# Patient Record
Sex: MALE | Race: WHITE | NOT HISPANIC OR LATINO | Employment: FULL TIME | URBAN - METROPOLITAN AREA
[De-identification: names, ages, dates, MRNs, and addresses within clinical notes are randomized per-mention and may not be internally consistent; named-entity substitution may affect disease eponyms.]

---

## 2022-05-19 ENCOUNTER — CLINICAL SUPPORT (OUTPATIENT)
Dept: SPORTS MEDICINE | Facility: CLINIC | Age: 27
End: 2022-05-19

## 2022-06-09 ENCOUNTER — OFFICE VISIT (OUTPATIENT)
Dept: SPORTS MEDICINE | Facility: CLINIC | Age: 27
End: 2022-06-09

## 2022-06-09 VITALS — WEIGHT: 193 LBS | BODY MASS INDEX: 23.5 KG/M2 | RESPIRATION RATE: 16 BRPM | HEIGHT: 76 IN | TEMPERATURE: 97.5 F

## 2022-06-09 DIAGNOSIS — S06.0X0A CONCUSSION WITHOUT LOSS OF CONSCIOUSNESS, INITIAL ENCOUNTER: Primary | ICD-10-CM

## 2022-06-09 PROCEDURE — LCFCNOCHG: Performed by: FAMILY MEDICINE

## 2022-06-09 NOTE — PROGRESS NOTES
"Zach is a 27 y.o. year old male presents to [facility]    Chief Complaint   Patient presents with   • Concussion     New concussion evaluation - DOI 06/05/2022        History of Present Illness  Loss of consciousness on the field for approximately 3 minutes for  report.  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  He was monitored through the remainder of the game in the locker room where he vomited once during the game.  He did have altered mental status, confusion.  Following the match, he vomited once again and was not improving in symptomatology so it was deemed prudent to have emergent evaluation at the local hospital in [facility].  From  report who was present during the entire evaluation, he had a CT head performed which was unremarkable.  He returned home by flight the following day and has been at home with observation by his fiancée.  His symptoms have been improving day by day though he still reports fatigue, feeling slowed down.  Has been working with optometrist for oculomotor rehab which has been helpful though he does endorse fatigue toward the end of the sessions.  Has been on 2 walks at home, 15 minutes approximately but he does report fatigue as well at the end.    I have reviewed the patient's medical, family, and social history in detail and updated the computerized patient record.    Temp 97.5 °F (36.4 °C) (Temporal)   Resp 16   Ht 193 cm (76\")   Wt 87.5 kg (193 lb)   BMI 23.49 kg/m²      Physical Exam    Mask worn thru encounter  Vital signs reviewed.   General: No acute distress.  Eyes: conjunctiva clear; pupils equally round and reactive; EOMI  ENT: external ears atraumatic  CV: no peripheral edema  Resp: normal respiratory effort, no use of accessory muscles  Skin: no rashes or wounds; normal turgor  Psych: mood and affect appropriate; recent and remote memory intact  Neuro: sensation to " light touch intact      Scat 5 form reviewed.  Total number of symptoms: 4/22.  Symptom severity score: 6/132.  Perception of normal: 85%.             Diagnoses and all orders for this visit:    Concussion without loss of consciousness, initial encounter    Can continue work with optometry regarding oculomotor rehab.  Did discuss continued advancement of physical activity as tolerated, can advance to sport specific rehab when his symptoms resolved.  Discussed timeline, expectations given his concussion.  He is hopeful for full recovery within the next 2 to 3 weeks.      Follow Up   Return if symptoms worsen or fail to improve.  Patient was given instructions and counseling regarding his condition or for health maintenance advice. Please see specific information pulled into the AVS if appropriate.     EMR Dragon/Transcription disclaimer:    Much of this encounter note is an electronic transcription/translation of spoken language to printed text.  The electronic translation of spoken language may permit erroneous, or at times, nonsensical words or phrases to be inadvertently transcribed.  Although I have reviewed the note for such errors some may still exist.

## 2022-06-27 ENCOUNTER — OFFICE VISIT (OUTPATIENT)
Dept: SPORTS MEDICINE | Facility: CLINIC | Age: 27
End: 2022-06-27

## 2022-06-27 VITALS — RESPIRATION RATE: 16 BRPM | WEIGHT: 193 LBS | HEIGHT: 76 IN | TEMPERATURE: 97.8 F | BODY MASS INDEX: 23.5 KG/M2

## 2022-06-27 DIAGNOSIS — S06.0X0D CONCUSSION WITHOUT LOSS OF CONSCIOUSNESS, SUBSEQUENT ENCOUNTER: Primary | ICD-10-CM

## 2022-06-27 PROCEDURE — LCFCNOCHG: Performed by: FAMILY MEDICINE

## 2022-06-27 NOTE — PROGRESS NOTES
"Zach is a 27 y.o. year old male presents to [facility]    Chief Complaint   Patient presents with   • Concussion     F/u eval for concussion - DOI 06/05/2022        History of Present Illness  sxs have resolved. 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 Has not been in full practice as of yet. Feels fatigue but relates it to deconditioning. No recreation of sxs during training. Has been working w/optometrist daily.    I have reviewed the patient's medical, family, and social history in detail and updated the computerized patient record.    Temp 97.8 °F (36.6 °C) (Temporal)   Resp 16   Ht 193 cm (75.98\")   Wt 87.5 kg (193 lb)   BMI 23.50 kg/m²      Physical Exam    Mask worn thru encounter  Vital signs reviewed.   General: No acute distress.  Eyes: conjunctiva clear; pupils equally round and reactive  ENT: external ears atraumatic  CV: no peripheral edema  Resp: normal respiratory effort, no use of accessory muscles  Skin: no rashes or wounds; normal turgor  Psych: mood and affect appropriate; recent and remote memory intact  Neuro: sensation to light touch intact    SCAT-5 form reviewed. 0 symptoms.               Diagnoses and all orders for this visit:    Concussion without loss of consciousness, subsequent encounter    0 symptoms. Continue to advance activity as tolerated.      Follow Up   No follow-ups on file.  Patient was given instructions and counseling regarding his condition or for health maintenance advice. Please see specific information pulled into the AVS if appropriate.     EMR Dragon/Transcription disclaimer:    Much of this encounter note is an electronic transcription/translation of spoken language to printed text.  The electronic translation of spoken language may permit erroneous, or at times, nonsensical words or phrases to be inadvertently transcribed.  Although I have reviewed the note for such errors some may still exist.   "

## 2022-07-29 ENCOUNTER — DOCUMENTATION (OUTPATIENT)
Dept: SPORTS MEDICINE | Facility: CLINIC | Age: 27
End: 2022-07-29

## 2022-07-29 RX ORDER — METHYLPREDNISOLONE 4 MG/1
TABLET ORAL
Qty: 21 TABLET | Refills: 0 | Status: SHIPPED | OUTPATIENT
Start: 2022-07-29 | End: 2022-10-29

## 2022-10-06 ENCOUNTER — OFFICE VISIT (OUTPATIENT)
Dept: ORTHOPEDIC SURGERY | Facility: CLINIC | Age: 27
End: 2022-10-06

## 2022-10-06 VITALS
BODY MASS INDEX: 23.5 KG/M2 | DIASTOLIC BLOOD PRESSURE: 75 MMHG | WEIGHT: 193 LBS | HEART RATE: 63 BPM | SYSTOLIC BLOOD PRESSURE: 117 MMHG | HEIGHT: 76 IN

## 2022-10-06 DIAGNOSIS — M54.42 LEFT-SIDED LOW BACK PAIN WITH LEFT-SIDED SCIATICA, UNSPECIFIED CHRONICITY: Primary | ICD-10-CM

## 2022-10-06 PROCEDURE — 72100 X-RAY EXAM L-S SPINE 2/3 VWS: CPT | Performed by: ORTHOPAEDIC SURGERY

## 2022-10-06 PROCEDURE — 99203 OFFICE O/P NEW LOW 30 MIN: CPT | Performed by: ORTHOPAEDIC SURGERY

## 2022-10-06 RX ORDER — METHYLPREDNISOLONE 4 MG/1
TABLET ORAL
Qty: 1 EACH | Refills: 0 | Status: SHIPPED | OUTPATIENT
Start: 2022-10-06 | End: 2022-10-29

## 2022-10-06 RX ORDER — CYCLOBENZAPRINE HCL 5 MG
10 TABLET ORAL 3 TIMES DAILY PRN
Qty: 45 TABLET | Refills: 0 | Status: SHIPPED | OUTPATIENT
Start: 2022-10-06

## 2022-10-06 NOTE — PROGRESS NOTES
"Subjective:     Patient ID: Zach Ann is a 27 y.o. male.    Chief Complaint:  Low back pain with radiation into the left posterior thigh  History of Present Illness  Zach Ann presents to clinic today for evaluation of left low back pain with radiation to left posterior thigh, had an acute exacerbation of pain last night and again with a collision episode with his knee and hip in a flexed position.  He has had pain and tightness in his posterior hamstring since that time, did improve overnight, rates associated pain as a 4-5 out of 10 shooting in nature denies any associated numbness or tingling does have some elements of burning type pain noted.  He did have a prior episode of back tightness and pain at the beginning of the season treated successfully with Medrol Dosepak.  Pain is worse with sitting walking and flexed hip position as well as with knee extension.  Denies any weakness to his left lower extremity     Social History     Occupational History   • Not on file   Tobacco Use   • Smoking status: Never Smoker   • Smokeless tobacco: Never Used   Vaping Use   • Vaping Use: Never used   Substance and Sexual Activity   • Alcohol use: Never   • Drug use: Never   • Sexual activity: Defer      History reviewed. No pertinent past medical history.  History reviewed. No pertinent surgical history.    History reviewed. No pertinent family history.      Review of Systems        Objective:  Vitals:    10/06/22 1119   BP: 117/75   Pulse: 63   Weight: 87.5 kg (193 lb)   Height: 193 cm (76\")         10/06/22  1119   Weight: 87.5 kg (193 lb)     Body mass index is 23.49 kg/m².  Physical Exam    Vital signs reviewed.   General: No acute distress, alert and oriented  Eyes: conjunctiva clear; pupils equally round and reactive  ENT: external ears and nose atraumatic; oropharynx clear  CV: no peripheral edema  Resp: normal respiratory effort  Skin: no rashes or wounds; normal turgor  Psych: mood and affect appropriate; recent " and remote memory intact          Ortho Exam     Lumbar spine-maximal tenderness in left paraspinal region with moderate increased pain on forward bend test mild increased pain on rotation to the left and right, moderately positive straight leg raise left lower extremity with hamstring contracture approximately 30 degrees on the left compared to 10 degrees on the right.  No hip pain in the groin region on logroll or Stinchfield exam.  Positive sensation light touch all distributions left foot and lower extremity symmetric to the right, 5 out of 5 strength on knee, ankle, and toe flexion and extension against resistance on the left symmetric to the right.    Imaging:  Lumbar Spine X-Ray  Indication: Pain  AP and Lateral views    Findings:  No fracture  No bony lesion  Normal soft tissues  Normal disc spaces    No prior studies were available for comparison.    Assessment:        1. Left-sided low back pain with left-sided sciatica, unspecified chronicity           Plan:          1. Discussed treatment options at length with patient at today's visit.  At this point time we will proceed with a prednisone taper as well as Flexeril to be taken at night  2. Recommended heat and stretching as well as treatment with  to try to help improve current level of symptoms  3. We will likely hold on a train for the next 4 to 5 days but can try to progress back into training at the beginning of next week  4. Follow up: As needed in training room      Zach Ann was in agreement with plan and had all questions answered.     Orders:  Orders Placed This Encounter   Procedures   • XR Spine Lumbar 2 or 3 View       Medications:  New Medications Ordered This Visit   Medications   • methylPREDNISolone (MEDROL) 4 MG dose pack     Sig: Take as directed on package instructions.     Dispense:  1 each     Refill:  0   • cyclobenzaprine (FLEXERIL) 5 MG tablet     Sig: Take 2 tablets by mouth 3 (Three) Times a Day As Needed for  Muscle Spasms.     Dispense:  45 tablet     Refill:  0       Followup:  No follow-ups on file.    Diagnoses and all orders for this visit:    1. Left-sided low back pain with left-sided sciatica, unspecified chronicity (Primary)  -     XR Spine Lumbar 2 or 3 View    Other orders  -     methylPREDNISolone (MEDROL) 4 MG dose pack; Take as directed on package instructions.  Dispense: 1 each; Refill: 0  -     cyclobenzaprine (FLEXERIL) 5 MG tablet; Take 2 tablets by mouth 3 (Three) Times a Day As Needed for Muscle Spasms.  Dispense: 45 tablet; Refill: 0          Dictated utilizing Dragon dictation

## 2022-10-10 ENCOUNTER — HOSPITAL ENCOUNTER (OUTPATIENT)
Dept: MRI IMAGING | Facility: HOSPITAL | Age: 27
Discharge: HOME | End: 2022-10-10

## 2022-10-10 DIAGNOSIS — M54.50 LUMBAR PAIN: Primary | ICD-10-CM

## 2022-10-10 DIAGNOSIS — M54.50 LUMBAR PAIN: ICD-10-CM

## 2022-10-10 PROCEDURE — 72148 MRI LUMBAR SPINE W/O DYE: CPT

## 2022-10-11 DIAGNOSIS — M51.26 LUMBAR DISC HERNIATION: Primary | ICD-10-CM

## 2022-10-11 RX ORDER — GABAPENTIN 300 MG/1
300 CAPSULE ORAL NIGHTLY
Qty: 30 CAPSULE | Refills: 0 | Status: SHIPPED | OUTPATIENT
Start: 2022-10-11 | End: 2022-11-15

## 2022-10-13 ENCOUNTER — TELEPHONE (OUTPATIENT)
Dept: SPORTS MEDICINE | Facility: CLINIC | Age: 27
End: 2022-10-13

## 2022-10-13 DIAGNOSIS — M51.26 LUMBAR DISC HERNIATION: Primary | ICD-10-CM

## 2022-10-13 NOTE — TELEPHONE ENCOUNTER
I called  Office and spoke with Atif from  office in regards to the referral for Neurosurgery. She informed me that they would get in contact with the patient to see when he is able to come in on Monday. No further action needed at this time. Thank you!        -RAFAELA Morrison

## 2022-10-17 ENCOUNTER — OFFICE VISIT (OUTPATIENT)
Dept: NEUROSURGERY | Facility: CLINIC | Age: 27
End: 2022-10-17

## 2022-10-17 VITALS
DIASTOLIC BLOOD PRESSURE: 84 MMHG | OXYGEN SATURATION: 98 % | WEIGHT: 193 LBS | HEIGHT: 76 IN | TEMPERATURE: 97.5 F | BODY MASS INDEX: 23.5 KG/M2 | SYSTOLIC BLOOD PRESSURE: 131 MMHG | HEART RATE: 89 BPM

## 2022-10-17 DIAGNOSIS — M51.16 NEURITIS OR RADICULITIS DUE TO RUPTURE OF LUMBAR INTERVERTEBRAL DISC: Primary | ICD-10-CM

## 2022-10-17 PROCEDURE — 99204 OFFICE O/P NEW MOD 45 MIN: CPT | Performed by: NEUROLOGICAL SURGERY

## 2022-10-17 RX ORDER — DEXAMETHASONE 1.5 MG/1
TABLET ORAL
Qty: 51 TABLET | Refills: 0 | Status: SHIPPED | OUTPATIENT
Start: 2022-10-17 | End: 2022-10-29 | Stop reason: SDUPTHER

## 2022-10-17 NOTE — PROGRESS NOTES
Subjective   Patient ID: Zach Ann is a 27 y.o. male is being seen for consultation today at the request of Jayden Vickers * for lumbar disc herniation. Patient had a MRI Lumbar on 10/10/2022 at [facility]    Treatment: no recent treatment    Today patient states that he has low back pain that radiates to the L leg, along with N/T in the L leg. Patient denied B/B incontinence.     Patient, Provider, and MA are all wearing a mask in our office today.     History of Present Illness     This patient began having trouble with his lower back some years ago.  This has come and gone over the years.  About 4 5 weeks ago he felt a pop in his back and had some pain in his back and into his left buttock.  About a week and a half ago the pain got a lot worse and he had pain radiating all the way down his leg into the hamstring.  He also noticed some weakness particularly with tiptoeing on the left side.  He said by the middle of last week he could not tiptoe at all but then started some physical therapy including modalities and has now gotten some improvement in the strength.  He cannot tiptoe although he does fatigue more easily.  The right side is okay.  He has no difficulty with bowel bladder control or other associated symptoms.  He has been treated with some oral steroids as well.  He has no difficulty with bowel bladder control.  Over the last several days the pain is gotten a lot better and he has no pain at all now.  He does still have some numbness in his lateral calf and lateral foot and the weakness still in his gastroc.    The following portions of the patient's history were reviewed and updated as appropriate: allergies, current medications, past family history, past medical history, past social history, past surgical history and problem list.    Review of Systems   Constitutional: Negative for chills and fever.   HENT: Negative for congestion.    Genitourinary: Negative for difficulty urinating and dysuria.  "  Musculoskeletal: Positive for back pain and myalgias. Negative for gait problem.        L leg pain   Neurological: Positive for weakness and numbness.       I have reviewed the review of systems as documented by my MA.      Objective     Vitals:    10/17/22 1457   BP: 131/84   Cuff Size: Adult   Pulse: 89   Temp: 97.5 °F (36.4 °C)   SpO2: 98%   Weight: 87.5 kg (193 lb)   Height: 193 cm (76\")     Body mass index is 23.49 kg/m².      Physical Exam  Constitutional:       Appearance: He is well-developed.   HENT:      Head: Normocephalic and atraumatic.   Eyes:      Extraocular Movements: EOM normal.      Conjunctiva/sclera: Conjunctivae normal.      Pupils: Pupils are equal, round, and reactive to light.   Neck:      Vascular: No carotid bruit.   Neurological:      Mental Status: He is oriented to person, place, and time.      Coordination: Finger-Nose-Finger Test and Heel to Shin Test normal.      Gait: Gait is intact.      Deep Tendon Reflexes:      Reflex Scores:       Tricep reflexes are 2+ on the right side and 2+ on the left side.       Bicep reflexes are 2+ on the right side and 2+ on the left side.       Brachioradialis reflexes are 2+ on the right side and 2+ on the left side.       Patellar reflexes are 2+ on the right side and 2+ on the left side.       Achilles reflexes are 2+ on the right side and 0 on the left side.  Psychiatric:         Speech: Speech normal.       Neurologic Exam     Mental Status   Oriented to person, place, and time.   Registration of memory: Good recent and remote memory.   Attention: normal. Concentration: normal.   Speech: speech is normal   Level of consciousness: alert  Knowledge: consistent with education.     Cranial Nerves     CN II   Visual fields full to confrontation.   Visual acuity: normal    CN III, IV, VI   Pupils are equal, round, and reactive to light.  Extraocular motions are normal.     CN V   Facial sensation intact.   Right corneal reflex: normal  Left corneal " reflex: normal    CN VII   Facial expression full, symmetric.   Right facial weakness: none  Left facial weakness: none    CN VIII   Hearing: intact    CN IX, X   Palate: symmetric    CN XI   Right sternocleidomastoid strength: normal  Left sternocleidomastoid strength: normal    CN XII   Tongue: not atrophic  Tongue deviation: none    Motor Exam   Muscle bulk: normal  Right arm tone: normal  Left arm tone: normal  Right leg tone: normal  Left leg tone: normal    Strength   Strength 5/5 except as noted.   Left gastroc: 4/5  He can tiptoe on the left 4 or 5 times but he does fatigue a bit more easily than on the right.     Sensory Exam   Light touch normal.   Sensory deficit distribution on left: S1    Gait, Coordination, and Reflexes     Gait  Gait: normal    Coordination   Finger to nose coordination: normal  Heel to shin coordination: normal    Reflexes   Right brachioradialis: 2+  Left brachioradialis: 2+  Right biceps: 2+  Left biceps: 2+  Right triceps: 2+  Left triceps: 2+  Right patellar: 2+  Left patellar: 2+  Right achilles: 2+  Left achilles: 0  Right : 2+  Left : 2+          Assessment & Plan   Independent Review of Radiographic Studies:      I personally reviewed the images from the following studies.    I reviewed an MRI of his lumbar spine done on 10 October.  This shows a fairly large disc herniation on the left side at L5-S1.  It is right at the level of the S1 pedicle and is almost certainly compressing the left S1 nerve root.    Medical Decision Making:      Had a very extended discussion with the patient and his  about the situation.  I explained that I would normally recommend proceeding with surgery on an urgent basis in this situation because he has a neurologic deficit and a large disc herniation.  On the other hand he has been getting some improvement in his neurologic exam and can now tiptoe versus last week when he could not at all.  Consequently this makes recommending urgent  surgery a little bit more difficult.  I suggested that we try him on another 13-day course of oral steroids and that he can go ahead and continue with his therapy.  I told him to do low impact therapy and lower weights that they load his lower back.  I think he can play soccer if he wishes I did explain that there is a small risk of a massive disc herniation causing a cauda equina syndrome but I really do not think this risk is any greater in him that it would be if we did surgery today, allowed him to heal and then let him go back to play.  I suggested that we follow him up after the season is over to see how his strength is.  That will be about another month from now and if he is not completely better then he probably should consider surgery.  He agrees with this.  They both had a number of questions which I answered to their satisfaction.  He said he would call for an appointment.    Diagnoses and all orders for this visit:    1. Neuritis or radiculitis due to rupture of lumbar intervertebral disc (Primary)    Other orders  -     dexamethasone (DECADRON) 1.5 MG tablet; 3 bid x 4d, 3 q AM and 2 q PM x 1d, 2 bid x 3d, 2 q AM and 1 q PM x 1d, 1 bid x 3d, 1 q AM until gone  Dispense: 51 tablet; Refill: 0      Return in about 4 weeks (around 11/14/2022).

## 2022-10-29 ENCOUNTER — DOCUMENTATION (OUTPATIENT)
Dept: SPORTS MEDICINE | Facility: CLINIC | Age: 27
End: 2022-10-29

## 2022-10-29 RX ORDER — DEXAMETHASONE 1.5 MG/1
TABLET ORAL
Qty: 51 TABLET | Refills: 0 | Status: SHIPPED | OUTPATIENT
Start: 2022-10-29 | End: 2022-11-15

## 2022-11-02 ENCOUNTER — TELEPHONE (OUTPATIENT)
Dept: NEUROSURGERY | Facility: CLINIC | Age: 27
End: 2022-11-02

## 2022-11-02 NOTE — TELEPHONE ENCOUNTER
Caller: Zach Ann    Relationship to patient: Self    Best call back number: [phone]    Chief complaint: WANTS TO SCHEDULE SURGERY    Type of visit: F/U    Requested date: AROUND 11-14-22 PER OFFICE VISIT NOTE 10-26-22    If rescheduling, when is the original appointment: 12-1-22    Additional notes:HUB SCHEDULED WITH CRISTI 12-1-22 F/A BUT PT WANTS SOONER APPT WITH CRISTI TO SCHEDULE SURGERY. 10-26-22 OFFICE VISIT NOTE PER CRISTI SCHEDULE AROUND 11-14-22.     PLEASE REVIEW AND CALL PT TO ADVISE.     THANK YOU

## 2022-11-10 ENCOUNTER — OFFICE VISIT (OUTPATIENT)
Dept: NEUROSURGERY | Facility: CLINIC | Age: 27
End: 2022-11-10

## 2022-11-10 ENCOUNTER — PREP FOR SURGERY (OUTPATIENT)
Dept: OTHER | Facility: HOSPITAL | Age: 27
End: 2022-11-10

## 2022-11-10 VITALS — HEIGHT: 76 IN | WEIGHT: 193 LBS | BODY MASS INDEX: 23.5 KG/M2

## 2022-11-10 DIAGNOSIS — M51.16 NEURITIS OR RADICULITIS DUE TO RUPTURE OF LUMBAR INTERVERTEBRAL DISC: Primary | ICD-10-CM

## 2022-11-10 PROCEDURE — 99214 OFFICE O/P EST MOD 30 MIN: CPT | Performed by: NEUROLOGICAL SURGERY

## 2022-11-10 RX ORDER — CEFAZOLIN SODIUM 2 G/100ML
2 INJECTION, SOLUTION INTRAVENOUS ONCE
Status: CANCELLED | OUTPATIENT
Start: 2022-11-16 | End: 2022-11-10

## 2022-11-10 NOTE — PROGRESS NOTES
"Subjective   Patient ID: Zach Ann is a 27 y.o. male is here today for 4 week follow-up.    Today Patient states he has intermittent N/T in the L leg. Patient denies low back pain    Patient, Provider, and MA are all wearing a mask in our office today.    History of Present Illness     This patient returns today.  He continues with weakness in his left leg.  It does not appear to have gotten any worse but it really has not gotten a lot better either.  He feels that he has stagnated in his improvement.    The following portions of the patient's history were reviewed and updated as appropriate: allergies, current medications, past family history, past medical history, past social history, past surgical history and problem list.    Review of Systems   Constitutional: Negative for chills and fever.   HENT: Negative for congestion.    Genitourinary: Negative for difficulty urinating and dysuria.   Musculoskeletal: Negative for back pain, gait problem and myalgias.   Neurological: Positive for numbness. Negative for weakness.        L leg       I have reviewed the review of systems as documented by my MA.      Objective     Vitals:    11/10/22 0835   Weight: 87.5 kg (193 lb)   Height: 193 cm (76\")     Body mass index is 23.49 kg/m².    Tobacco Use: Low Risk    • Smoking Tobacco Use: Never   • Smokeless Tobacco Use: Never   • Passive Exposure: Not on file          Physical Exam  Neurological:      Mental Status: He is alert and oriented to person, place, and time.       Neurologic Exam     Mental Status   Oriented to person, place, and time.           Assessment & Plan   Independent Review of Radiographic Studies:      I personally reviewed the images from the following studies.    I again reviewed his MRI done on 10 October.  This shows a very large disc herniation on the left side at L5-S1.    Medical Decision Making:      I told the patient, his wife and his  about the imaging.  Since he still has " weakness I see little alternative but to proceed with surgery.  I told the patient about the risks, complications and expected outcome of the lumbar surgery.  I explained that there was an 80% chance of getting rid of the pain in the leg.  I explained that there would still be back pain after the surgery.  Initially this will be quite severe but will improve over time.  There is a 2 or 3% chance of infection, bleeding, CSF leak, damage to the nerve as a result of surgery, paralysis, as well as anesthetic risk.  There is a 5% chance of late instability which could require a fusion later on and a 10% chance of recurrent disc herniation.  We discussed the postoperative hospital and home course.  He would like to proceed.    He will need to be scheduled for a: Left lumbar 5 to sacral 1 laminectomy and discectomy with metrx    Diagnoses and all orders for this visit:    1. Neuritis or radiculitis due to rupture of lumbar intervertebral disc (Primary)      Return for 2-3 week post op.

## 2022-11-15 ENCOUNTER — PRE-ADMISSION TESTING (OUTPATIENT)
Dept: PREADMISSION TESTING | Facility: HOSPITAL | Age: 27
End: 2022-11-15

## 2022-11-15 ENCOUNTER — TELEPHONE (OUTPATIENT)
Dept: NEUROSURGERY | Facility: CLINIC | Age: 27
End: 2022-11-15

## 2022-11-15 VITALS
DIASTOLIC BLOOD PRESSURE: 70 MMHG | WEIGHT: 200 LBS | TEMPERATURE: 97.6 F | HEIGHT: 76 IN | HEART RATE: 49 BPM | RESPIRATION RATE: 20 BRPM | OXYGEN SATURATION: 100 % | SYSTOLIC BLOOD PRESSURE: 112 MMHG | BODY MASS INDEX: 24.36 KG/M2

## 2022-11-15 LAB
ANION GAP SERPL CALCULATED.3IONS-SCNC: 6.9 MMOL/L (ref 5–15)
BUN SERPL-MCNC: 12 MG/DL (ref 6–20)
BUN/CREAT SERPL: 13.6 (ref 7–25)
CALCIUM SPEC-SCNC: 9.8 MG/DL (ref 8.6–10.5)
CHLORIDE SERPL-SCNC: 104 MMOL/L (ref 98–107)
CO2 SERPL-SCNC: 29.1 MMOL/L (ref 22–29)
CREAT SERPL-MCNC: 0.88 MG/DL (ref 0.76–1.27)
DEPRECATED RDW RBC AUTO: 42.1 FL (ref 37–54)
EGFRCR SERPLBLD CKD-EPI 2021: 120.1 ML/MIN/1.73
ERYTHROCYTE [DISTWIDTH] IN BLOOD BY AUTOMATED COUNT: 13 % (ref 12.3–15.4)
GLUCOSE SERPL-MCNC: 102 MG/DL (ref 65–99)
HCT VFR BLD AUTO: 39.9 % (ref 37.5–51)
HGB BLD-MCNC: 13.5 G/DL (ref 13–17.7)
MCH RBC QN AUTO: 30.3 PG (ref 26.6–33)
MCHC RBC AUTO-ENTMCNC: 33.8 G/DL (ref 31.5–35.7)
MCV RBC AUTO: 89.5 FL (ref 79–97)
PLATELET # BLD AUTO: 167 10*3/MM3 (ref 140–450)
PMV BLD AUTO: 10.4 FL (ref 6–12)
POTASSIUM SERPL-SCNC: 4.7 MMOL/L (ref 3.5–5.2)
RBC # BLD AUTO: 4.46 10*6/MM3 (ref 4.14–5.8)
SODIUM SERPL-SCNC: 140 MMOL/L (ref 136–145)
WBC NRBC COR # BLD: 4.73 10*3/MM3 (ref 3.4–10.8)

## 2022-11-15 PROCEDURE — 80048 BASIC METABOLIC PNL TOTAL CA: CPT

## 2022-11-15 PROCEDURE — 36415 COLL VENOUS BLD VENIPUNCTURE: CPT

## 2022-11-15 PROCEDURE — 85027 COMPLETE CBC AUTOMATED: CPT

## 2022-11-15 RX ORDER — CHLORHEXIDINE GLUCONATE 500 MG/1
1 CLOTH TOPICAL
COMMUNITY
End: 2022-12-09 | Stop reason: HOSPADM

## 2022-11-15 NOTE — TELEPHONE ENCOUNTER
I had to r/s pt's first post op appt. He is now on 12/1/22 at 2:45 pm in office with Leida Uribe. His phone is going straight to v/m and it is full so I cannot leave a message. I am mailing a ne appt reminder.

## 2022-11-15 NOTE — DISCHARGE INSTRUCTIONS

## 2022-11-16 ENCOUNTER — TELEPHONE (OUTPATIENT)
Dept: NEUROSURGERY | Facility: CLINIC | Age: 27
End: 2022-11-16

## 2022-11-16 ENCOUNTER — PREP FOR SURGERY (OUTPATIENT)
Dept: OTHER | Facility: HOSPITAL | Age: 27
End: 2022-11-16

## 2022-11-16 ENCOUNTER — OFFICE VISIT (OUTPATIENT)
Dept: NEUROSURGERY | Facility: CLINIC | Age: 27
End: 2022-11-16

## 2022-11-16 VITALS
HEART RATE: 89 BPM | OXYGEN SATURATION: 98 % | WEIGHT: 200 LBS | HEIGHT: 76 IN | DIASTOLIC BLOOD PRESSURE: 84 MMHG | SYSTOLIC BLOOD PRESSURE: 129 MMHG | BODY MASS INDEX: 24.36 KG/M2 | TEMPERATURE: 98 F

## 2022-11-16 DIAGNOSIS — M51.16 NEURITIS OR RADICULITIS DUE TO RUPTURE OF LUMBAR INTERVERTEBRAL DISC: Primary | ICD-10-CM

## 2022-11-16 PROCEDURE — 99214 OFFICE O/P EST MOD 30 MIN: CPT | Performed by: NEUROLOGICAL SURGERY

## 2022-11-16 RX ORDER — CEFAZOLIN SODIUM 2 G/100ML
2 INJECTION, SOLUTION INTRAVENOUS ONCE
Status: CANCELLED | OUTPATIENT
Start: 2022-11-16 | End: 2022-11-16

## 2022-11-16 NOTE — PROGRESS NOTES
"Subjective   Patient ID: Zach Ann is a 27 y.o. male is here today for follow-up to discuss surgery.    Today patient is having N/T in the L leg    Patient, Provider, and MA are all wearing a mask in our office today.     History of Present Illness    This patient continues with pain as well as numbness and tingling in his left leg.  His symptoms are really no better.  He was supposed to have surgery this morning but canceled it because he got concerned about the laminectomy portion of the surgery.    The following portions of the patient's history were reviewed and updated as appropriate: allergies, current medications, past family history, past medical history, past social history, past surgical history and problem list.    Review of Systems   Constitutional: Negative for chills and fever.   HENT: Negative for congestion.    Musculoskeletal: Negative for back pain and myalgias.   Neurological: Positive for weakness and numbness.       I have reviewed the review of systems as documented by my MA.      Objective     Vitals:    11/16/22 1546   BP: 129/84   Cuff Size: Adult   Pulse: 89   Temp: 98 °F (36.7 °C)   SpO2: 98%   Weight: 90.7 kg (200 lb)   Height: 193 cm (76\")     Body mass index is 24.34 kg/m².    Tobacco Use: Low Risk    • Smoking Tobacco Use: Never   • Smokeless Tobacco Use: Never   • Passive Exposure: Not on file          Physical Exam  Neurological:      Mental Status: He is alert and oriented to person, place, and time.       Neurologic Exam     Mental Status   Oriented to person, place, and time.           Assessment & Plan   Independent Review of Radiographic Studies:      I personally reviewed the images from the following studies.    I again reviewed his MRI which was done in October.  This does show a herniated disc on the left side at L5-S1 going down behind the S1 vertebral body.    Medical Decision Making:      I again explained a lumbar laminectomy and discectomy to the patient and his wife. "  I explained that the laminectomy is really more of a laminotomy.  I explained that we have to remove enough bone so as to get to the disc herniation but not so much to destabilize him.  I did explain there was a 10% chance of recurrent disc and a 5% chance of late instability which could require a fusion later on.  On the other hand as long as he is in reasonably good physical condition the chances of that happening should be fairly low.  There is a risk of damaging the nerve as well as the risk of infection, bleeding, and CSF leak.  We again reviewed the postoperative hospital and home course.  He does wish to proceed at this point.  Diagnoses and all orders for this visit:    1. Neuritis or radiculitis due to rupture of lumbar intervertebral disc (Primary)      Return for 2-3 week post op.

## 2022-11-16 NOTE — TELEPHONE ENCOUNTER
Patient called and stated he was suppose to have surgery today but when the nurse went over the procedure with him he stated he did not  fully understand what was  the procedure. He thought it was something else. 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 Stanton wanted him to live the office for clarification.

## 2022-12-09 ENCOUNTER — ANESTHESIA EVENT (OUTPATIENT)
Dept: PERIOP | Facility: HOSPITAL | Age: 27
End: 2022-12-09

## 2022-12-09 ENCOUNTER — ANESTHESIA (OUTPATIENT)
Dept: PERIOP | Facility: HOSPITAL | Age: 27
End: 2022-12-09

## 2022-12-09 ENCOUNTER — HOSPITAL ENCOUNTER (OUTPATIENT)
Facility: HOSPITAL | Age: 27
Setting detail: HOSPITAL OUTPATIENT SURGERY
Discharge: HOME | End: 2022-12-09
Attending: NEUROLOGICAL SURGERY | Admitting: NEUROLOGICAL SURGERY

## 2022-12-09 ENCOUNTER — APPOINTMENT (OUTPATIENT)
Dept: GENERAL RADIOLOGY | Facility: HOSPITAL | Age: 27
End: 2022-12-09

## 2022-12-09 VITALS
WEIGHT: 196.1 LBS | OXYGEN SATURATION: 96 % | BODY MASS INDEX: 23.88 KG/M2 | TEMPERATURE: 97.8 F | DIASTOLIC BLOOD PRESSURE: 69 MMHG | SYSTOLIC BLOOD PRESSURE: 129 MMHG | HEIGHT: 76 IN | RESPIRATION RATE: 16 BRPM | HEART RATE: 68 BPM

## 2022-12-09 DIAGNOSIS — M51.16 NEURITIS OR RADICULITIS DUE TO RUPTURE OF LUMBAR INTERVERTEBRAL DISC: ICD-10-CM

## 2022-12-09 PROCEDURE — 25010000002 NEOSTIGMINE 5 MG/10ML SOLUTION: Performed by: NURSE ANESTHETIST, CERTIFIED REGISTERED

## 2022-12-09 PROCEDURE — 63030 LAMOT DCMPRN NRV RT 1 LMBR: CPT | Performed by: NEUROLOGICAL SURGERY

## 2022-12-09 PROCEDURE — 63030 LAMOT DCMPRN NRV RT 1 LMBR: CPT | Performed by: SPECIALIST/TECHNOLOGIST, OTHER

## 2022-12-09 PROCEDURE — 25010000002 CEFAZOLIN IN DEXTROSE 2-4 GM/100ML-% SOLUTION: Performed by: NEUROLOGICAL SURGERY

## 2022-12-09 PROCEDURE — 25010000002 VANCOMYCIN 1 G RECONSTITUTED SOLUTION 1 EACH VIAL: Performed by: NEUROLOGICAL SURGERY

## 2022-12-09 PROCEDURE — 25010000002 DEXAMETHASONE PER 1 MG: Performed by: NURSE ANESTHETIST, CERTIFIED REGISTERED

## 2022-12-09 PROCEDURE — 72100 X-RAY EXAM L-S SPINE 2/3 VWS: CPT

## 2022-12-09 PROCEDURE — 25010000002 HYDROMORPHONE PER 4 MG: Performed by: NURSE ANESTHETIST, CERTIFIED REGISTERED

## 2022-12-09 PROCEDURE — 25010000002 PROPOFOL 10 MG/ML EMULSION: Performed by: NURSE ANESTHETIST, CERTIFIED REGISTERED

## 2022-12-09 PROCEDURE — C1713 ANCHOR/SCREW BN/BN,TIS/BN: HCPCS | Performed by: NEUROLOGICAL SURGERY

## 2022-12-09 PROCEDURE — 25010000002 FENTANYL CITRATE (PF) 50 MCG/ML SOLUTION: Performed by: NURSE ANESTHETIST, CERTIFIED REGISTERED

## 2022-12-09 PROCEDURE — 25010000002 ONDANSETRON PER 1 MG: Performed by: NURSE ANESTHETIST, CERTIFIED REGISTERED

## 2022-12-09 PROCEDURE — 76000 FLUOROSCOPY <1 HR PHYS/QHP: CPT

## 2022-12-09 DEVICE — FLOSEAL WITH RECOTHROM - 5ML
Type: IMPLANTABLE DEVICE | Site: SPINE LUMBAR | Status: FUNCTIONAL
Brand: FLOSEAL HEMOSTATIC MATRIX

## 2022-12-09 DEVICE — SSC BONE WAX
Type: IMPLANTABLE DEVICE | Site: SPINE LUMBAR | Status: FUNCTIONAL
Brand: SSC BONE WAX

## 2022-12-09 RX ORDER — SODIUM CHLORIDE, SODIUM LACTATE, POTASSIUM CHLORIDE, CALCIUM CHLORIDE 600; 310; 30; 20 MG/100ML; MG/100ML; MG/100ML; MG/100ML
9 INJECTION, SOLUTION INTRAVENOUS CONTINUOUS
Status: DISCONTINUED | OUTPATIENT
Start: 2022-12-09 | End: 2022-12-09 | Stop reason: HOSPADM

## 2022-12-09 RX ORDER — GLYCOPYRROLATE 0.2 MG/ML
INJECTION INTRAMUSCULAR; INTRAVENOUS AS NEEDED
Status: DISCONTINUED | OUTPATIENT
Start: 2022-12-09 | End: 2022-12-09 | Stop reason: SURG

## 2022-12-09 RX ORDER — DIPHENHYDRAMINE HCL 25 MG
25 CAPSULE ORAL
Status: DISCONTINUED | OUTPATIENT
Start: 2022-12-09 | End: 2022-12-09 | Stop reason: HOSPADM

## 2022-12-09 RX ORDER — NALOXONE HCL 0.4 MG/ML
0.2 VIAL (ML) INJECTION AS NEEDED
Status: DISCONTINUED | OUTPATIENT
Start: 2022-12-09 | End: 2022-12-09 | Stop reason: HOSPADM

## 2022-12-09 RX ORDER — DEXAMETHASONE SODIUM PHOSPHATE 4 MG/ML
INJECTION, SOLUTION INTRA-ARTICULAR; INTRALESIONAL; INTRAMUSCULAR; INTRAVENOUS; SOFT TISSUE AS NEEDED
Status: DISCONTINUED | OUTPATIENT
Start: 2022-12-09 | End: 2022-12-09 | Stop reason: SURG

## 2022-12-09 RX ORDER — FAMOTIDINE 10 MG/ML
20 INJECTION, SOLUTION INTRAVENOUS ONCE
Status: COMPLETED | OUTPATIENT
Start: 2022-12-09 | End: 2022-12-09

## 2022-12-09 RX ORDER — OXYCODONE AND ACETAMINOPHEN 7.5; 325 MG/1; MG/1
1 TABLET ORAL EVERY 4 HOURS PRN
Status: DISCONTINUED | OUTPATIENT
Start: 2022-12-09 | End: 2022-12-09 | Stop reason: HOSPADM

## 2022-12-09 RX ORDER — FENTANYL CITRATE 50 UG/ML
50 INJECTION, SOLUTION INTRAMUSCULAR; INTRAVENOUS
Status: DISCONTINUED | OUTPATIENT
Start: 2022-12-09 | End: 2022-12-09 | Stop reason: HOSPADM

## 2022-12-09 RX ORDER — HYDROMORPHONE HYDROCHLORIDE 1 MG/ML
0.5 INJECTION, SOLUTION INTRAMUSCULAR; INTRAVENOUS; SUBCUTANEOUS
Status: DISCONTINUED | OUTPATIENT
Start: 2022-12-09 | End: 2022-12-09 | Stop reason: HOSPADM

## 2022-12-09 RX ORDER — HYDROCODONE BITARTRATE AND ACETAMINOPHEN 5; 325 MG/1; MG/1
1 TABLET ORAL ONCE AS NEEDED
Status: DISCONTINUED | OUTPATIENT
Start: 2022-12-09 | End: 2022-12-09 | Stop reason: HOSPADM

## 2022-12-09 RX ORDER — SODIUM CHLORIDE 0.9 % (FLUSH) 0.9 %
3-10 SYRINGE (ML) INJECTION AS NEEDED
Status: DISCONTINUED | OUTPATIENT
Start: 2022-12-09 | End: 2022-12-09 | Stop reason: HOSPADM

## 2022-12-09 RX ORDER — CEFAZOLIN SODIUM 2 G/100ML
2 INJECTION, SOLUTION INTRAVENOUS ONCE
Status: COMPLETED | OUTPATIENT
Start: 2022-12-09 | End: 2022-12-09

## 2022-12-09 RX ORDER — LIDOCAINE HYDROCHLORIDE 10 MG/ML
0.5 INJECTION, SOLUTION EPIDURAL; INFILTRATION; INTRACAUDAL; PERINEURAL ONCE AS NEEDED
Status: DISCONTINUED | OUTPATIENT
Start: 2022-12-09 | End: 2022-12-09 | Stop reason: HOSPADM

## 2022-12-09 RX ORDER — PROPOFOL 10 MG/ML
VIAL (ML) INTRAVENOUS AS NEEDED
Status: DISCONTINUED | OUTPATIENT
Start: 2022-12-09 | End: 2022-12-09 | Stop reason: SURG

## 2022-12-09 RX ORDER — ROCURONIUM BROMIDE 10 MG/ML
INJECTION, SOLUTION INTRAVENOUS AS NEEDED
Status: DISCONTINUED | OUTPATIENT
Start: 2022-12-09 | End: 2022-12-09 | Stop reason: SURG

## 2022-12-09 RX ORDER — PROMETHAZINE HYDROCHLORIDE 25 MG/1
25 TABLET ORAL ONCE AS NEEDED
Status: DISCONTINUED | OUTPATIENT
Start: 2022-12-09 | End: 2022-12-09 | Stop reason: HOSPADM

## 2022-12-09 RX ORDER — PROMETHAZINE HYDROCHLORIDE 25 MG/1
25 SUPPOSITORY RECTAL ONCE AS NEEDED
Status: DISCONTINUED | OUTPATIENT
Start: 2022-12-09 | End: 2022-12-09 | Stop reason: HOSPADM

## 2022-12-09 RX ORDER — FLUMAZENIL 0.1 MG/ML
0.2 INJECTION INTRAVENOUS AS NEEDED
Status: DISCONTINUED | OUTPATIENT
Start: 2022-12-09 | End: 2022-12-09 | Stop reason: HOSPADM

## 2022-12-09 RX ORDER — HYDRALAZINE HYDROCHLORIDE 20 MG/ML
5 INJECTION INTRAMUSCULAR; INTRAVENOUS
Status: DISCONTINUED | OUTPATIENT
Start: 2022-12-09 | End: 2022-12-09 | Stop reason: HOSPADM

## 2022-12-09 RX ORDER — SODIUM CHLORIDE 0.9 % (FLUSH) 0.9 %
3 SYRINGE (ML) INJECTION EVERY 12 HOURS SCHEDULED
Status: DISCONTINUED | OUTPATIENT
Start: 2022-12-09 | End: 2022-12-09 | Stop reason: HOSPADM

## 2022-12-09 RX ORDER — LABETALOL HYDROCHLORIDE 5 MG/ML
5 INJECTION, SOLUTION INTRAVENOUS
Status: DISCONTINUED | OUTPATIENT
Start: 2022-12-09 | End: 2022-12-09 | Stop reason: HOSPADM

## 2022-12-09 RX ORDER — NEOSTIGMINE METHYLSULFATE 0.5 MG/ML
INJECTION, SOLUTION INTRAVENOUS AS NEEDED
Status: DISCONTINUED | OUTPATIENT
Start: 2022-12-09 | End: 2022-12-09 | Stop reason: SURG

## 2022-12-09 RX ORDER — MIDAZOLAM HYDROCHLORIDE 1 MG/ML
1 INJECTION INTRAMUSCULAR; INTRAVENOUS
Status: DISCONTINUED | OUTPATIENT
Start: 2022-12-09 | End: 2022-12-09 | Stop reason: HOSPADM

## 2022-12-09 RX ORDER — HYDROCODONE BITARTRATE AND ACETAMINOPHEN 5; 325 MG/1; MG/1
1 TABLET ORAL EVERY 4 HOURS PRN
Qty: 35 TABLET | Refills: 0 | Status: SHIPPED | OUTPATIENT
Start: 2022-12-09

## 2022-12-09 RX ORDER — FENTANYL CITRATE 50 UG/ML
INJECTION, SOLUTION INTRAMUSCULAR; INTRAVENOUS AS NEEDED
Status: DISCONTINUED | OUTPATIENT
Start: 2022-12-09 | End: 2022-12-09 | Stop reason: SURG

## 2022-12-09 RX ORDER — DIPHENHYDRAMINE HYDROCHLORIDE 50 MG/ML
12.5 INJECTION INTRAMUSCULAR; INTRAVENOUS
Status: DISCONTINUED | OUTPATIENT
Start: 2022-12-09 | End: 2022-12-09 | Stop reason: HOSPADM

## 2022-12-09 RX ORDER — CEPHALEXIN 500 MG/1
500 CAPSULE ORAL 4 TIMES DAILY
Qty: 20 CAPSULE | Refills: 0 | Status: SHIPPED | OUTPATIENT
Start: 2022-12-09 | End: 2022-12-14

## 2022-12-09 RX ORDER — ONDANSETRON 2 MG/ML
INJECTION INTRAMUSCULAR; INTRAVENOUS AS NEEDED
Status: DISCONTINUED | OUTPATIENT
Start: 2022-12-09 | End: 2022-12-09 | Stop reason: SURG

## 2022-12-09 RX ORDER — LIDOCAINE HYDROCHLORIDE 20 MG/ML
INJECTION, SOLUTION INFILTRATION; PERINEURAL AS NEEDED
Status: DISCONTINUED | OUTPATIENT
Start: 2022-12-09 | End: 2022-12-09 | Stop reason: SURG

## 2022-12-09 RX ORDER — ONDANSETRON 2 MG/ML
4 INJECTION INTRAMUSCULAR; INTRAVENOUS ONCE AS NEEDED
Status: DISCONTINUED | OUTPATIENT
Start: 2022-12-09 | End: 2022-12-09 | Stop reason: HOSPADM

## 2022-12-09 RX ORDER — EPHEDRINE SULFATE 50 MG/ML
5 INJECTION, SOLUTION INTRAVENOUS ONCE AS NEEDED
Status: DISCONTINUED | OUTPATIENT
Start: 2022-12-09 | End: 2022-12-09 | Stop reason: HOSPADM

## 2022-12-09 RX ADMIN — FAMOTIDINE 20 MG: 10 INJECTION INTRAVENOUS at 09:44

## 2022-12-09 RX ADMIN — LIDOCAINE HYDROCHLORIDE 60 MG: 20 INJECTION, SOLUTION INFILTRATION; PERINEURAL at 10:47

## 2022-12-09 RX ADMIN — CEFAZOLIN SODIUM 2 G: 2 INJECTION, SOLUTION INTRAVENOUS at 10:35

## 2022-12-09 RX ADMIN — GLYCOPYRROLATE 0.6 MCG: 1 INJECTION INTRAMUSCULAR; INTRAVENOUS at 11:50

## 2022-12-09 RX ADMIN — ROCURONIUM BROMIDE 40 MG: 50 INJECTION INTRAVENOUS at 10:47

## 2022-12-09 RX ADMIN — DEXAMETHASONE SODIUM PHOSPHATE 8 MG: 4 INJECTION, SOLUTION INTRA-ARTICULAR; INTRALESIONAL; INTRAMUSCULAR; INTRAVENOUS; SOFT TISSUE at 11:00

## 2022-12-09 RX ADMIN — FENTANYL CITRATE 50 MCG: 0.05 INJECTION, SOLUTION INTRAMUSCULAR; INTRAVENOUS at 11:07

## 2022-12-09 RX ADMIN — PROPOFOL 200 MG: 10 INJECTION, EMULSION INTRAVENOUS at 10:47

## 2022-12-09 RX ADMIN — ONDANSETRON 4 MG: 2 INJECTION INTRAMUSCULAR; INTRAVENOUS at 11:50

## 2022-12-09 RX ADMIN — SODIUM CHLORIDE, POTASSIUM CHLORIDE, SODIUM LACTATE AND CALCIUM CHLORIDE 9 ML/HR: 600; 310; 30; 20 INJECTION, SOLUTION INTRAVENOUS at 09:43

## 2022-12-09 RX ADMIN — FENTANYL CITRATE 50 MCG: 0.05 INJECTION, SOLUTION INTRAMUSCULAR; INTRAVENOUS at 11:05

## 2022-12-09 RX ADMIN — NEOSTIGMINE METHYLSULFATE 4 MG: 0.5 INJECTION INTRAVENOUS at 11:50

## 2022-12-09 RX ADMIN — HYDROMORPHONE HYDROCHLORIDE 0.5 MG: 1 INJECTION, SOLUTION INTRAMUSCULAR; INTRAVENOUS; SUBCUTANEOUS at 12:51

## 2022-12-09 NOTE — BRIEF OP NOTE
LUMBAR DISCECTOMY POSTERIOR WITH METRIX  Progress Note    Zach Ann  12/9/2022    Pre-op Diagnosis:   Neuritis or radiculitis due to rupture of lumbar intervertebral disc [M51.16]       Post-Op Diagnosis Codes:     * Neuritis or radiculitis due to rupture of lumbar intervertebral disc [M51.16]    Procedure/CPT® Codes:        Procedure(s):  Left lumbar 5 to sacral 1 laminectomy and discectomy with metrx        Surgeon(s):  Lane Stanton MD    Anesthesia: General    Staff:   Circulator: Smooth Gongora RN; Rohan Mendoza RN  Radiology Technologist: Janet Perez  Scrub Person: Mary Sharma  Assistant: Vicki Montano CSA  Assistant: Vicki Montano CSA      Estimated Blood Loss: 100ml    Urine Voided: * No values recorded between 12/9/2022 10:42 AM and 12/9/2022 12:06 PM *    Specimens:                None          Drains: * No LDAs found *    Findings: Large disc herniation adherent to the dura        Complications: None      Lane Stanton MD     Date: 12/9/2022  Time: 12:27 EST

## 2022-12-09 NOTE — OP NOTE
Preop diagnosis: Herniated disc left L5-S1 causing lumbar radiculopathy with neurologic deficit    Postop diagnosis: same    Procedure performed: Left L5-S1 laminectomy, medial facetectomy, discectomy using minimal access spinal technologies microsurgical technique microsurgical instrumentation    Surgeon: Lane Stanton M.D.    Assistant: Vicki Montano CFA who was instrumental in helping with visualization of neural structures, hemostasis, and retraction of neural structures.  Her skilled assistance was necessary for the success of this case    Indications for the procedure:  This is a patient with severe pain in the legs.  Previous imaging had shown neural compression at the L5-S1 levels.  As a result of this and the failure of conservative therapy the patient elected to proceed with surgery.    Operative summary:  After induction of general anesthesia the patient was intubated and placed on the operating table in the prone position on a Dave table.  All pressure points were padded including peripheral points of entrapment.  The back was prepped with Chloraprep and then draped with Ioban, half sheets, and a split sheet.      The L5-S1 level was localized with intraoperative fluoroscopy an incision was made on the left just above the pedicle.  Successive dilating tubes up to 18 mm by 4 cm were placed over that area.  Soft tissue was then removed from the supralaminar space.  The inferior laminar arch of L5 as well as the superior laminar arch of S1 and the medial aspect of facet were removed with the Kiersten drill.  The remainder of the operation was done under high-power magnification of the operating microscope using microsurgical technique and microsurgical instrumentation.  The ligamentum flavum was opened and removed out to the level of the pedicle using the Kerrison rongeurs.  This exposed the lateral thecal sac and the nerve root of S1.  Once the lateral recess was opened the dissection was carried up to  the L5 pedicle completely decompressing the superior nerve root.    Once this was done the S1 nerve root was mobilized medially to expose the disc.  There was evidence of a large disc herniation compressing the nerve just under the nerve root.  This was carefully teased out and then the disc space was incised and disc material was removed with the down-biting cervical curettes and the pituitary rongeurs.  It should be noted that the disc was extremely adherent to the dura and had to be peeled off.  In addition there was a more medial condyle component to the disc that I tried to remove as well using right angle instruments but I tried to avoid retracting the nerve root very much.  At 1 point I did go into her axillary and looked at it that way but the retraction laterally on the nerve root was going to be almost as bad as the retraction medially and so I elected not to do that.  Once the disc space was emptied as much as possible bleeding was controlled with bipolar cautery.    Once the entire decompression was completed we were able to explore under the nerve root and the thecal sac using the Castellanos ball probe to be sure there was no evidence of residual compression.  There being none bleeding was controlled again using the bipolar cautery, FloSeal, and thrombin and Gelfoam.  The area was then copiously irrigated with vancomycin solution and the tube was removed. The paraspinous musculature was injected with 100 cc 1/8% Marcaine with 1:200,000 epinephrine solution.    Another gram of Kefzol was given prior to closure.    The incision was then closed in layers and dressed and the patient was taken to the recovery room in stable condition there were no apparent complications.  Sponge, instrument, and needle counts were correct at the end of the procedure.

## 2022-12-09 NOTE — NURSING NOTE
Ambulated around PACU without difficulty. When pt first got up c/o numbness when first sat up but went away withing 10 seconds per pt. No c/o numbness and tingling

## 2022-12-09 NOTE — DISCHARGE INSTRUCTIONS
LUMBAR SURGERY - AMOS COLIN M.D.  [address], [address]  [place] [address]  [phone]    Instructions & Care After Your Lumbar Surgery    1. No sitting except on the commode.  You may lie on a firm couch but not on a waterbed or recliner.  You may lie in any position that is comfortable, using only one pillow under your head. Either stand at a counter or lie on your side for meals. Three weeks after surgery you may begin sitting for 30 minutes 3 times per day.    2. No driving for three weeks.  You may ride in the car in a passenger seat that reclines or lying down in the back seat.      3. No bending. If you drop something allow someone else to pick it up.    4. Don’t lift anything heavier than a coffee cup or paperback book.    5. Gradually increase your activity each day.  You should get out of bed every hour during the day.  Walk outside as soon as you feel up to it.  Walk short distances frequently rather than making a long trip.  Frequency is more important than distance.  Your goal is to be walking 2 to 3 miles per day when you return for your post-operative visit. (Never do this in one trip.)    6. You may climb stairs.    7. Remove your bandage the second day after surgery and leave it off.  If you notice any redness, swelling or drainage, call the office.  There are steri-strips across the incision.  If these are still present ten days after surgery, remove them gently.      8. You may shower five days after surgery.  Keep the incision dry until then.  Don’t let the water beat directly on the incision and gently pat it dry.    9. Physical Therapy will be arranged at your post-operative visit if needed.    10. Your prescription for pain medication may be filled for half the original amount prior to your return office visit.  Due to changes in Federal Law in order to have this medication refilled you must contact the office four days prior to the date and make arrangements to pick the  prescription up in the office.  This prescription refill cannot be called in to the pharmacy. Your prescription will be ready for pick-up the day the refill is due.    Don’t be alarmed if you experience some of your pre-operative symptoms after going home.  This is not uncommon and normally goes away in a few days but may last longer.  If you have any questions or concerns, please call our office.

## 2022-12-09 NOTE — ANESTHESIA PROCEDURE NOTES
Airway  Urgency: elective    Date/Time: 12/9/2022 10:51 AM  Airway not difficult    General Information and Staff    Patient location during procedure: OR    Indications and Patient Condition  Indications for airway management: airway protection    Preoxygenated: yes  MILS maintained throughout  Mask difficulty assessment: 1 - vent by mask    Final Airway Details  Final airway type: endotracheal airway      Successful airway: ETT  Cuffed: yes   Successful intubation technique: direct laryngoscopy  Facilitating devices/methods: intubating stylet  Endotracheal tube insertion site: oral  Blade: Dat  Blade size: 3  ETT size (mm): 7.0  Cormack-Lehane Classification: grade I - full view of glottis  Placement verified by: chest auscultation and capnometry   Measured from: lips  ETT/EBT  to lips (cm): 22  Number of attempts at approach: 1  Assessment: lips, teeth, and gum same as pre-op and atraumatic intubation

## 2022-12-09 NOTE — ANESTHESIA PREPROCEDURE EVALUATION
Anesthesia Evaluation     Patient summary reviewed and Nursing notes reviewed                Airway   Mallampati: I  TM distance: >3 FB  Neck ROM: full  No difficulty expected  Dental - normal exam     Pulmonary - negative pulmonary ROS and normal exam   Cardiovascular - negative cardio ROS and normal exam        Neuro/Psych- negative ROS  GI/Hepatic/Renal/Endo - negative ROS     Musculoskeletal (-) negative ROS    Abdominal  - normal exam    Bowel sounds: normal.   Substance History - negative use     OB/GYN negative ob/gyn ROS         Other                        Anesthesia Plan    ASA 1     general       Anesthetic plan, risks, benefits, and alternatives have been provided, discussed and informed consent has been obtained with: patient.        CODE STATUS:

## 2022-12-10 NOTE — ANESTHESIA POSTPROCEDURE EVALUATION
Patient: Zach Ann    Procedure Summary     Date: 12/09/22 Room / Location: [facility] OR [address] [place] [address] MAIN OR    Anesthesia Start: 1042 Anesthesia Stop: 1212    Procedure: Left lumbar 5 to sacral 1 laminectomy and discectomy with metrx (Left: Spine Lumbar) Diagnosis:       Neuritis or radiculitis due to rupture of lumbar intervertebral disc      (Neuritis or radiculitis due to rupture of lumbar intervertebral disc [M51.16])    Surgeons: Lane Stanton MD Provider: Rodney Ga MD    Anesthesia Type: general ASA Status: 1          Anesthesia Type: general    Vitals  Vitals Value Taken Time   /69 12/09/22 1411   Temp 36.6 °C (97.8 °F) 12/09/22 1355   Pulse 60 12/09/22 1410   Resp 16 12/09/22 1410   SpO2 96 % 12/09/22 1415   Vitals shown include unvalidated device data.        Post Anesthesia Care and Evaluation    Patient location during evaluation: bedside  Patient participation: complete - patient participated  Level of consciousness: awake  Pain score: 1  Pain management: adequate    Airway patency: patent  Anesthetic complications: No anesthetic complications  PONV Status: none  Cardiovascular status: acceptable  Respiratory status: acceptable  Hydration status: acceptable

## 2022-12-27 ENCOUNTER — OFFICE VISIT (OUTPATIENT)
Dept: NEUROSURGERY | Facility: CLINIC | Age: 27
End: 2022-12-27

## 2022-12-27 DIAGNOSIS — Z09 FOLLOW-UP EXAMINATION FOLLOWING SURGERY: Primary | ICD-10-CM

## 2022-12-27 PROCEDURE — 99024 POSTOP FOLLOW-UP VISIT: CPT | Performed by: NEUROLOGICAL SURGERY

## 2022-12-27 NOTE — PROGRESS NOTES
Subjective   Patient ID: Zach Ann is a 27 y.o. male is here today via telephone for 2 week PO follow-up. Patient had a Left lumbar 5 to sacral 1 laminectomy and discectomy with metrx on 12.09.2022    You have chosen to receive care through a telephone visit. Do you consent to use a telephone visit for your medical care today? Yes    We had a telephone visit today.  The patient was at home and I was in the office.  We talked for 5 minutes.    History of Present Illness    I talked to this patient today.  He is doing pretty well.  The numbness and tingling in his leg is gone the pain is gone as well.  He feels he is beginning to get strength back in his calf.    The following portions of the patient's history were reviewed and updated as appropriate: allergies, current medications, past family history, past medical history, past social history, past surgical history and problem list.    Review of Systems    I have reviewed the review of systems as documented by my MA.      Objective       Tobacco Use: Low Risk    • Smoking Tobacco Use: Never   • Smokeless Tobacco Use: Never   • Passive Exposure: Not on file          Physical Exam  Neurological:      Mental Status: He is alert and oriented to person, place, and time.       Neurologic Exam     Mental Status   Oriented to person, place, and time.           Assessment & Plan   Independent Review of Radiographic Studies:      I personally reviewed the images from the following studies.    There is no new imaging to review    Medical Decision Making:      I told the patient he can go ahead and start physical therapy at this point.  He is to call if any problems develop otherwise we will see him back in the office in about a month.  I also told him that he can begin sitting 3 or 4 times a day for another 2 or 3 weeks and then get rid of the sitting restrictions after that.    Diagnoses and all orders for this visit:    1. Follow-up examination following surgery  (Primary)  -     Ambulatory Referral to Physical Therapy      Return in about 4 weeks (around 1/24/2023).

## 2023-01-23 ENCOUNTER — OFFICE VISIT (OUTPATIENT)
Dept: SPORTS MEDICINE | Facility: CLINIC | Age: 28
End: 2023-01-23

## 2023-01-23 VITALS
HEIGHT: 76 IN | SYSTOLIC BLOOD PRESSURE: 124 MMHG | RESPIRATION RATE: 16 BRPM | HEART RATE: 60 BPM | DIASTOLIC BLOOD PRESSURE: 80 MMHG | WEIGHT: 195.2 LBS | OXYGEN SATURATION: 97 % | BODY MASS INDEX: 23.77 KG/M2

## 2023-01-23 DIAGNOSIS — Z00.00 ANNUAL PHYSICAL EXAM: Primary | ICD-10-CM

## 2023-01-23 LAB
25(OH)D3 SERPL-MCNC: 40.2 NG/ML (ref 30–100)
ALBUMIN SERPL-MCNC: 5.2 G/DL (ref 3.5–5.2)
ALBUMIN/GLOB SERPL: 3.5 G/DL
ALP SERPL-CCNC: 74 U/L (ref 39–117)
ALT SERPL W P-5'-P-CCNC: 17 U/L (ref 1–41)
ANION GAP SERPL CALCULATED.3IONS-SCNC: 7 MMOL/L (ref 5–15)
AST SERPL-CCNC: 19 U/L (ref 1–40)
BASOPHILS # BLD AUTO: 0.04 10*3/MM3 (ref 0–0.2)
BASOPHILS NFR BLD AUTO: 0.6 % (ref 0–1.5)
BILIRUB SERPL-MCNC: 0.4 MG/DL (ref 0–1.2)
BUN SERPL-MCNC: 20 MG/DL (ref 6–20)
BUN/CREAT SERPL: 22.5 (ref 7–25)
CALCIUM SPEC-SCNC: 9.5 MG/DL (ref 8.6–10.5)
CHLORIDE SERPL-SCNC: 103 MMOL/L (ref 98–107)
CHOLEST SERPL-MCNC: 142 MG/DL (ref 0–200)
CO2 SERPL-SCNC: 29 MMOL/L (ref 22–29)
CREAT SERPL-MCNC: 0.89 MG/DL (ref 0.76–1.27)
DEPRECATED RDW RBC AUTO: 41.4 FL (ref 37–54)
EGFRCR SERPLBLD CKD-EPI 2021: 119.7 ML/MIN/1.73
EOSINOPHIL # BLD AUTO: 0.06 10*3/MM3 (ref 0–0.4)
EOSINOPHIL NFR BLD AUTO: 0.9 % (ref 0.3–6.2)
ERYTHROCYTE [DISTWIDTH] IN BLOOD BY AUTOMATED COUNT: 12.9 % (ref 12.3–15.4)
GLOBULIN UR ELPH-MCNC: 1.5 GM/DL
GLUCOSE SERPL-MCNC: 109 MG/DL (ref 65–99)
HCT VFR BLD AUTO: 39.3 % (ref 37.5–51)
HDLC SERPL QL: 2.54
HDLC SERPL-MCNC: 56 MG/DL (ref 40–60)
HGB BLD-MCNC: 12.9 G/DL (ref 13–17.7)
IMM GRANULOCYTES # BLD AUTO: 0.01 10*3/MM3 (ref 0–0.05)
IMM GRANULOCYTES NFR BLD AUTO: 0.2 % (ref 0–0.5)
IRON 24H UR-MRATE: 77 MCG/DL (ref 59–158)
IRON SATN MFR SERPL: 24 % (ref 20–50)
LDLC SERPL CALC-MCNC: 68 MG/DL (ref 0–100)
LYMPHOCYTES # BLD AUTO: 1.79 10*3/MM3 (ref 0.7–3.1)
LYMPHOCYTES NFR BLD AUTO: 28.3 % (ref 19.6–45.3)
MCH RBC QN AUTO: 28.7 PG (ref 26.6–33)
MCHC RBC AUTO-ENTMCNC: 32.8 G/DL (ref 31.5–35.7)
MCV RBC AUTO: 87.5 FL (ref 79–97)
MONOCYTES # BLD AUTO: 0.41 10*3/MM3 (ref 0.1–0.9)
MONOCYTES NFR BLD AUTO: 6.5 % (ref 5–12)
NEUTROPHILS NFR BLD AUTO: 4.01 10*3/MM3 (ref 1.7–7)
NEUTROPHILS NFR BLD AUTO: 63.5 % (ref 42.7–76)
NRBC BLD AUTO-RTO: 0 /100 WBC (ref 0–0.2)
PLATELET # BLD AUTO: 202 10*3/MM3 (ref 140–450)
PMV BLD AUTO: 11 FL (ref 6–12)
POTASSIUM SERPL-SCNC: 4.2 MMOL/L (ref 3.5–5.2)
PROT SERPL-MCNC: 6.7 G/DL (ref 6–8.5)
RBC # BLD AUTO: 4.49 10*6/MM3 (ref 4.14–5.8)
SODIUM SERPL-SCNC: 139 MMOL/L (ref 136–145)
TIBC SERPL-MCNC: 317 MCG/DL (ref 298–536)
TRANSFERRIN SERPL-MCNC: 213 MG/DL (ref 200–360)
TRIGL SERPL-MCNC: 99 MG/DL (ref 0–150)
VLDLC SERPL-MCNC: 18 MG/DL (ref 5–40)
WBC NRBC COR # BLD: 6.32 10*3/MM3 (ref 3.4–10.8)

## 2023-01-23 PROCEDURE — 85660 RBC SICKLE CELL TEST: CPT | Performed by: FAMILY MEDICINE

## 2023-01-23 PROCEDURE — 36415 COLL VENOUS BLD VENIPUNCTURE: CPT | Performed by: FAMILY MEDICINE

## 2023-01-23 PROCEDURE — 84466 ASSAY OF TRANSFERRIN: CPT | Performed by: FAMILY MEDICINE

## 2023-01-23 PROCEDURE — LCFCNOCHG: Performed by: FAMILY MEDICINE

## 2023-01-23 PROCEDURE — 82728 ASSAY OF FERRITIN: CPT | Performed by: FAMILY MEDICINE

## 2023-01-23 PROCEDURE — 82306 VITAMIN D 25 HYDROXY: CPT | Performed by: FAMILY MEDICINE

## 2023-01-23 PROCEDURE — 80061 LIPID PANEL: CPT | Performed by: FAMILY MEDICINE

## 2023-01-23 PROCEDURE — 84443 ASSAY THYROID STIM HORMONE: CPT | Performed by: FAMILY MEDICINE

## 2023-01-23 PROCEDURE — 80053 COMPREHEN METABOLIC PANEL: CPT | Performed by: FAMILY MEDICINE

## 2023-01-23 PROCEDURE — 83540 ASSAY OF IRON: CPT | Performed by: FAMILY MEDICINE

## 2023-01-23 PROCEDURE — 85025 COMPLETE CBC W/AUTO DIFF WBC: CPT | Performed by: FAMILY MEDICINE

## 2023-01-23 NOTE — PROGRESS NOTES
I examined Zach today in regards to his low back in particular-he has had majority of his strength return to his left lower extremity with nearly symmetric single-leg toe stance on the left compared to the right as well as 4+ out of 5 strength on the left compared to the right against active resistance.  Negative straight leg raise on exam today his incisions well-healed he has no paraspinal tenderness good recovery of forward bend and lumbar extension with no significant exacerbation of pain.    At this point time we will discuss return to play plan with Dr. Stanton-clinically Zach is progressing very well at this point in his recovery program.

## 2023-01-23 NOTE — PROGRESS NOTES
"Zach Ann is here today for an annual physical exam.     Overall doing well.  Recovering well from lumbar discectomy.    Health Maintenance   Topic Date Due   • COVID-19 Vaccine (1) Never done   • TDAP/TD VACCINES (2 - Td or Tdap) 09/01/2015   • HEPATITIS C SCREENING  Never done   • ANNUAL PHYSICAL  Never done   • INFLUENZA VACCINE  Never done   • Pneumococcal Vaccine 0-64  Aged Out       Review of Systems    /80 (BP Location: Left arm, Patient Position: Sitting, Cuff Size: Adult)   Pulse 60   Resp 16   Ht 193 cm (76\")   Wt 88.5 kg (195 lb 3.2 oz)   SpO2 97%   BMI 23.76 kg/m²      Physical Exam    Vital signs reviewed.  General appearance: No acute distress  Eyes: conjunctiva clear without erythema; pupils equally round and reactive  ENT: external ears normal; hearing normal  Neck: supple; no thyromegaly  CV: normal rate and rhythm; no peripheral edema  Respiratory: normal respiratory effort; lungs clear to auscultation bilaterally  MSK: normal gait and station; no focal joint deformity or swelling  Skin: no rash or wounds; normal turgor  Neuro: cranial nerves 2-12 grossly intact; normal sensation to light touch  Psych: mood and affect normal; recent and remote memory intact      Current Outpatient Medications:   •  cyclobenzaprine (FLEXERIL) 5 MG tablet, Take 2 tablets by mouth 3 (Three) Times a Day As Needed for Muscle Spasms., Disp: 45 tablet, Rfl: 0  •  HYDROcodone-acetaminophen (NORCO) 5-325 MG per tablet, Take 1 tablet by mouth Every 4 (Four) Hours As Needed for Moderate Pain (pain)., Disp: 35 tablet, Rfl: 0    Diagnoses and all orders for this visit:    1. Annual physical exam (Primary)  -     Iron Profile  -     Ferritin  -     CBC & Differential  -     Comprehensive Metabolic Panel  -     Lipid Panel With / Chol / HDL Ratio  -     Vitamin D,25-Hydroxy  -     Sickle Cell Screen  -     Cancel: Thyroid Dillwyn Profile  -     TSH      Overall well. EKG scanned.   Continue to rehab low back and " regain left leg strength.

## 2023-01-24 LAB
FERRITIN SERPL-MCNC: 146 NG/ML (ref 30–400)
TSH SERPL DL<=0.05 MIU/L-ACNC: 1.06 UIU/ML (ref 0.27–4.2)

## 2023-01-25 ENCOUNTER — TELEPHONE (OUTPATIENT)
Dept: SPORTS MEDICINE | Facility: CLINIC | Age: 28
End: 2023-01-25
Payer: COMMERCIAL

## 2023-01-25 LAB — HGB S BLD QL SOLY: NEGATIVE

## 2023-01-25 NOTE — TELEPHONE ENCOUNTER
Reviewed pre-season labs. WNL.    Office Visit on 01/23/2023   Component Date Value Ref Range Status   • Iron 01/23/2023 77  59 - 158 mcg/dL Final   • Iron Saturation 01/23/2023 24  20 - 50 % Final   • Transferrin 01/23/2023 213  200 - 360 mg/dL Final   • TIBC 01/23/2023 317  298 - 536 mcg/dL Final   • Ferritin 01/23/2023 146.00  30.00 - 400.00 ng/mL Final   • Glucose 01/23/2023 109 (H)  65 - 99 mg/dL Final   • BUN 01/23/2023 20  6 - 20 mg/dL Final   • Creatinine 01/23/2023 0.89  0.76 - 1.27 mg/dL Final   • Sodium 01/23/2023 139  136 - 145 mmol/L Final   • Potassium 01/23/2023 4.2  3.5 - 5.2 mmol/L Final   • Chloride 01/23/2023 103  98 - 107 mmol/L Final   • CO2 01/23/2023 29.0  22.0 - 29.0 mmol/L Final   • Calcium 01/23/2023 9.5  8.6 - 10.5 mg/dL Final   • Total Protein 01/23/2023 6.7  6.0 - 8.5 g/dL Final   • Albumin 01/23/2023 5.2  3.5 - 5.2 g/dL Final   • ALT (SGPT) 01/23/2023 17  1 - 41 U/L Final   • AST (SGOT) 01/23/2023 19  1 - 40 U/L Final   • Alkaline Phosphatase 01/23/2023 74  39 - 117 U/L Final   • Total Bilirubin 01/23/2023 0.4  0.0 - 1.2 mg/dL Final   • Globulin 01/23/2023 1.5  gm/dL Final   • A/G Ratio 01/23/2023 3.5  g/dL Final   • BUN/Creatinine Ratio 01/23/2023 22.5  7.0 - 25.0 Final   • Anion Gap 01/23/2023 7.0  5.0 - 15.0 mmol/L Final   • eGFR 01/23/2023 119.7  >60.0 mL/min/1.73 Final   • Total Cholesterol 01/23/2023 142  0 - 200 mg/dL Final   • Triglycerides 01/23/2023 99  0 - 150 mg/dL Final   • HDL Cholesterol 01/23/2023 56  40 - 60 mg/dL Final   • LDL Cholesterol  01/23/2023 68  0 - 100 mg/dL Final   • VLDL Cholesterol 01/23/2023 18  5 - 40 mg/dL Final   • Chol/HDL Ratio 01/23/2023 2.54   Final   • 25 Hydroxy, Vitamin D 01/23/2023 40.2  30.0 - 100.0 ng/ml Final   • Hemoglobin (Hgb) Solubility 01/23/2023 Negative  Negative Final    Since a variety of conditions and other abnormal hemoglobins in  addition to Hemoglobin S may give false-positive results, positive  Hemoglobin Solubility tests  should be confirmed by hemoglobin  fractionation testing.   • TSH 01/23/2023 1.060  0.270 - 4.200 uIU/mL Final   • WBC 01/23/2023 6.32  3.40 - 10.80 10*3/mm3 Final   • RBC 01/23/2023 4.49  4.14 - 5.80 10*6/mm3 Final   • Hemoglobin 01/23/2023 12.9 (L)  13.0 - 17.7 g/dL Final   • Hematocrit 01/23/2023 39.3  37.5 - 51.0 % Final   • MCV 01/23/2023 87.5  79.0 - 97.0 fL Final   • MCH 01/23/2023 28.7  26.6 - 33.0 pg Final   • MCHC 01/23/2023 32.8  31.5 - 35.7 g/dL Final   • RDW 01/23/2023 12.9  12.3 - 15.4 % Final   • RDW-SD 01/23/2023 41.4  37.0 - 54.0 fl Final   • MPV 01/23/2023 11.0  6.0 - 12.0 fL Final   • Platelets 01/23/2023 202  140 - 450 10*3/mm3 Final   • Neutrophil % 01/23/2023 63.5  42.7 - 76.0 % Final   • Lymphocyte % 01/23/2023 28.3  19.6 - 45.3 % Final   • Monocyte % 01/23/2023 6.5  5.0 - 12.0 % Final   • Eosinophil % 01/23/2023 0.9  0.3 - 6.2 % Final   • Basophil % 01/23/2023 0.6  0.0 - 1.5 % Final   • Immature Grans % 01/23/2023 0.2  0.0 - 0.5 % Final   • Neutrophils, Absolute 01/23/2023 4.01  1.70 - 7.00 10*3/mm3 Final   • Lymphocytes, Absolute 01/23/2023 1.79  0.70 - 3.10 10*3/mm3 Final   • Monocytes, Absolute 01/23/2023 0.41  0.10 - 0.90 10*3/mm3 Final   • Eosinophils, Absolute 01/23/2023 0.06  0.00 - 0.40 10*3/mm3 Final   • Basophils, Absolute 01/23/2023 0.04  0.00 - 0.20 10*3/mm3 Final   • Immature Grans, Absolute 01/23/2023 0.01  0.00 - 0.05 10*3/mm3 Final   • nRBC 01/23/2023 0.0  0.0 - 0.2 /100 WBC Final

## 2023-02-14 ENCOUNTER — TELEPHONE (OUTPATIENT)
Dept: NEUROSURGERY | Facility: CLINIC | Age: 28
End: 2023-02-14
Payer: COMMERCIAL

## 2023-02-14 NOTE — TELEPHONE ENCOUNTER
Caller: BOYD    Relationship: Caregiver (non-relative)    Best call back number: [phone]    What form or medical record are you requesting: RETURN TO PLAY CLEARANCE     Who is requesting this form or medical record from you: [facility] [employer]     How would you like to receive the form or medical records (pick-up, mail, fax):        Timeframe paperwork needed: ASAP    Additional notes:     BOYD CALLED AND IS REQUESTING FOR A RETURN TO PLAY WORK CLEARANCE - PLEASE CALL BOYD WHEN FORM IS READY OR  WITH ANY QUESTIONS.  REQUESTED MELANIE OR RAMBO TO BE THE ONES TO CALL OR HANDLE - THANK YOU!

## 2023-02-15 ENCOUNTER — OFFICE VISIT (OUTPATIENT)
Dept: NEUROSURGERY | Facility: CLINIC | Age: 28
End: 2023-02-15
Payer: COMMERCIAL

## 2023-02-15 DIAGNOSIS — M51.16 NEURITIS OR RADICULITIS DUE TO RUPTURE OF LUMBAR INTERVERTEBRAL DISC: Primary | ICD-10-CM

## 2023-02-15 PROCEDURE — 99024 POSTOP FOLLOW-UP VISIT: CPT | Performed by: NEUROLOGICAL SURGERY

## 2023-02-15 NOTE — TELEPHONE ENCOUNTER
PATIENT CALLED AND STATES HE IS EXPECTING A CALL BACK TODAY AND HE STATES THAT HE WILL BE UNAVAILABLE BETWEEN  9 AND 12.      HE ASKED TO PLEASE RETURN HIS CALL AFTER THIS TIME.    THANK YOU

## 2023-02-15 NOTE — PROGRESS NOTES
Subjective   Patient ID: Zach Ann is a 27 y.o. male is here today for 2 month PO follow-up. Patient had a Left lumbar 5 to sacral 1 laminectomy and discectomy with metrx on 12.09.2022    Today patient states that he feels well overall.     Patient, Provider, and MA are all wearing a mask in our office today    History of Present Illness    This patient has been doing really well.  He has no weakness in his foot or his leg anymore at all.  About a week ago he twisted and had a twinge of pain and was sent for an MRI in [place] but it was done without contrast according to the patient.  He does not remember any IV sticks.    The following portions of the patient's history were reviewed and updated as appropriate: allergies, current medications, past family history, past medical history, past social history, past surgical history and problem list.    Review of Systems   Constitutional: Negative for chills and fever.   HENT: Negative for congestion.    Genitourinary: Negative for difficulty urinating and dysuria.   Musculoskeletal: Negative for back pain and gait problem.   Neurological: Negative for weakness and numbness.       I have reviewed the review of systems as documented by my MA.      Objective     There were no vitals filed for this visit.  There is no height or weight on file to calculate BMI.    Tobacco Use: Low Risk    • Smoking Tobacco Use: Never   • Smokeless Tobacco Use: Never   • Passive Exposure: Not on file          Physical Exam  Neurological:      Mental Status: He is alert and oriented to person, place, and time.       Neurologic Exam     Mental Status   Oriented to person, place, and time.           Assessment & Plan   Independent Review of Radiographic Studies:      I personally reviewed the images from the following studies.    Neither the MRI images or the report are available to me today.  According to the patient Dr. Vickers reviewed the report and felt there may be some recurrent  disc.    Medical Decision Making:      I told the patient that if it was done without contrast is not helpful because of the scar tissue that we will be in there it will be impossible to tell between recurrent disc and scar tissue.  Therefore we will just send him for an MRI with and without contrast but he is doing well enough I see no reason he cannot return to normal activities including practicing and playing games.  I will call him with the results of the MRI.    Diagnoses and all orders for this visit:    1. Neuritis or radiculitis due to rupture of lumbar intervertebral disc (Primary)  -     MRI Lumbar Spine With & Without Contrast; Future      Return for After radiology test.

## 2023-02-15 NOTE — TELEPHONE ENCOUNTER
02/15/2023 10:42am   S/w patient and he says he can be here today at 1:30pm  ____________________________________________     for patient, informing per Dr Stanton he needs to be seen in the office before we can clear him to play soccer

## 2023-02-21 ENCOUNTER — HOSPITAL ENCOUNTER (OUTPATIENT)
Dept: MRI IMAGING | Facility: HOSPITAL | Age: 28
Discharge: HOME | End: 2023-02-21
Payer: OTHER MISCELLANEOUS

## 2023-02-21 DIAGNOSIS — M51.16 NEURITIS OR RADICULITIS DUE TO RUPTURE OF LUMBAR INTERVERTEBRAL DISC: ICD-10-CM

## 2023-02-21 PROCEDURE — 0 GADOBENATE DIMEGLUMINE 529 MG/ML SOLUTION: Performed by: NEUROLOGICAL SURGERY

## 2023-02-21 PROCEDURE — A9577 INJ MULTIHANCE: HCPCS | Performed by: NEUROLOGICAL SURGERY

## 2023-02-21 PROCEDURE — 72158 MRI LUMBAR SPINE W/O & W/DYE: CPT

## 2023-02-21 RX ADMIN — GADOBENATE DIMEGLUMINE 18 ML: 529 INJECTION, SOLUTION INTRAVENOUS at 14:24

## 2023-02-23 ENCOUNTER — OFFICE VISIT (OUTPATIENT)
Dept: NEUROSURGERY | Facility: CLINIC | Age: 28
End: 2023-02-23
Payer: COMMERCIAL

## 2023-02-23 DIAGNOSIS — M51.16 NEURITIS OR RADICULITIS DUE TO RUPTURE OF LUMBAR INTERVERTEBRAL DISC: Primary | ICD-10-CM

## 2023-02-23 PROCEDURE — 99024 POSTOP FOLLOW-UP VISIT: CPT | Performed by: NEUROLOGICAL SURGERY

## 2023-02-23 NOTE — PROGRESS NOTES
Subjective   Patient ID: Zach Ann is a 27 y.o. male is here today via telephone  for follow-up with a new MRI L-spine on 02/21/2023 @ [facility].    You have chosen to receive care through a telephone visit. Do you consent to use a telephone visit for your medical care today? Yes    We had a telephone visit today.  The patient was at home and I was in the office.  We talked for 5 minutes.    History of Present Illness    I talked to this patient today.  He is still feeling okay overall.    The following portions of the patient's history were reviewed and updated as appropriate: allergies, current medications, past family history, past medical history, past social history, past surgical history and problem list.    Review of Systems    I reviewed the review of systems listed by the patient and discussed by my MA    Objective         Tobacco Use: Low Risk    • Smoking Tobacco Use: Never   • Smokeless Tobacco Use: Never   • Passive Exposure: Not on file          Physical Exam  Neurological:      Mental Status: He is alert and oriented to person, place, and time.       Neurologic Exam     Mental Status   Oriented to person, place, and time.           Assessment & Plan   Independent Review of Radiographic Studies:      I personally reviewed the images from the following studies.    I reviewed his lumbar MRI done with and without contrast.  This shows complete evacuation of the herniated portion of the disc.  There is a little osteophytic spur bulging at the level of the disc on the left side but no evidence of any recurrent or residual disc.  I completely disagree with the radiology interpretation that is deviating the left S1 nerve root.  There is significant scar tissue around the S1 nerve root as expected and overall I Would call this MRI normal for postoperative films.    Medical Decision Making:      I told the patient I see no reason why he cannot return to totally normal activities at this point.  He can  always have a recurrent disc herniation but I do not think it is any more likely in his situation than anyone else.  He will call if anything flares up in the future.    Diagnoses and all orders for this visit:    1. Neuritis or radiculitis due to rupture of lumbar intervertebral disc (Primary)      Return if symptoms worsen or fail to improve.

## 2023-03-23 ENCOUNTER — OFFICE VISIT (OUTPATIENT)
Dept: SPORTS MEDICINE | Facility: CLINIC | Age: 28
End: 2023-03-23

## 2023-03-23 VITALS
BODY MASS INDEX: 23.62 KG/M2 | TEMPERATURE: 98.5 F | DIASTOLIC BLOOD PRESSURE: 50 MMHG | OXYGEN SATURATION: 99 % | WEIGHT: 194 LBS | HEART RATE: 52 BPM | SYSTOLIC BLOOD PRESSURE: 118 MMHG | HEIGHT: 76 IN

## 2023-03-23 DIAGNOSIS — H83.2X9 VESTIBULAR DYSFUNCTION, UNSPECIFIED LATERALITY: Primary | ICD-10-CM
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
## 2023-03-24 ENCOUNTER — TREATMENT (OUTPATIENT)
Dept: PHYSICAL THERAPY | Facility: CLINIC | Age: 28
End: 2023-03-24

## 2023-03-24 DIAGNOSIS — R42 DIZZINESS: Primary | ICD-10-CM

## 2023-03-24 PROCEDURE — 97161 PT EVAL LOW COMPLEX 20 MIN: CPT | Performed by: PHYSICAL THERAPIST
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
## 2023-05-09 ENCOUNTER — OFFICE VISIT (OUTPATIENT)
Dept: SPORTS MEDICINE | Facility: CLINIC | Age: 28
End: 2023-05-09

## 2023-05-09 VITALS — WEIGHT: 194 LBS | HEIGHT: 76 IN | RESPIRATION RATE: 16 BRPM | BODY MASS INDEX: 23.62 KG/M2

## 2023-05-09 DIAGNOSIS — Z98.890 STATUS POST LUMBAR SURGERY: ICD-10-CM

## 2023-05-09 DIAGNOSIS — M54.50 LUMBAR PAIN: Primary | ICD-10-CM

## 2023-05-09 RX ORDER — METHYLPREDNISOLONE 4 MG/1
TABLET ORAL
Qty: 21 TABLET | Refills: 0 | Status: SHIPPED | OUTPATIENT
Start: 2023-05-09

## 2023-05-09 RX ORDER — METHOCARBAMOL 500 MG/1
500 TABLET, FILM COATED ORAL NIGHTLY PRN
Qty: 30 TABLET | Refills: 0 | Status: SHIPPED | OUTPATIENT
Start: 2023-05-09

## 2023-05-09 NOTE — PROGRESS NOTES
"Zach is a 28 y.o. year old male presents to [facility]    Chief Complaint   Patient presents with   • Back Pain   • Leg Pain     Eval for LT hamstring        History of Present Illness  New problem.  He is now approximately 5 months post lumbar laminectomy, facetectomy.  He has had some setbacks this spring with full return as he has had intermittent pain.  He most recently noticed some pain in his lumbar region over the past week.  He has not returned to full training as a result.  His pain is left-sided and in the lower lumbar region.  Occasionally he does get pain down into the buttock and proximal hamstring but no further.  He does not have pain when seated.  He does not have pain with activity which has only been gym and light cardio over the past week.  He has been self-limiting due to the pain.  He denies any weakness in the leg nor in the foot.    Office Visit with Lane Stanton MD (02/23/2023)    I have reviewed the patient's medical, family, and social history in detail and updated the computerized patient record.    Resp 16   Ht 193 cm (75.98\")   Wt 88 kg (194 lb)   BMI 23.62 kg/m²      Physical Exam    Vital signs reviewed.   General: No acute distress.  Eyes: conjunctiva clear; pupils equally round and reactive  ENT: external ears atraumatic  CV: no peripheral edema  Resp: normal respiratory effort, no use of accessory muscles  Skin: no rashes or wounds; normal turgor  Psych: mood and affect appropriate; recent and remote memory intact  Neuro: sensation to light touch intact; 2+ DTR L4, S1 bilateral    MSK Exam  L-spine: Focal area of tenderness lateral to left L3, L4 transverse processes.  This is proximal to his surgical incision which is not tender.  Negative straight leg raise bilateral.  Negative EMELIA FADIR.  There is minimal tenderness along the piriformis muscle on the left.  There is no tenderness along the proximal hamstring at its origin.    MRI Lumbar " Spine With & Without Contrast (02/21/2023 14:44)  Per radiologist, L5-S1 prior laminectomy, partial facetectomy. Disc protrusion seen w/in L subarticular zone near S1 nerve root.             Diagnoses and all orders for this visit:    Lumbar pain    Status post lumbar discectomy    Other orders  -     methylPREDNISolone (MEDROL) 4 MG dose pack; Take as directed on package instructions.  -     methocarbamol (ROBAXIN) 500 MG tablet; Take 1 tablet by mouth At Night As Needed for Muscle Spasms.      I have reviewed previous MRI which was taken post operatively.  Have also reviewed his encounter with Dr. Stanton regarding interpretation of this MRI which was all considered to be expected postsurgery.  He is not having any red flag symptoms, radicular symptoms as he was preop.  I believe his symptoms to be more lumbar muscular in nature.  I am prescribing Medrol Dosepak, muscle relaxer.  He can continue to train as tolerated.      Follow Up   No follow-ups on file.  Patient was given instructions and counseling regarding his condition or for health maintenance advice. Please see specific information pulled into the AVS if appropriate.     EMR Dragon/Transcription disclaimer:    Much of this encounter note is an electronic transcription/translation of spoken language to printed text.  The electronic translation of spoken language may permit erroneous, or at times, nonsensical words or phrases to be inadvertently transcribed.  Although I have reviewed the note for such errors some may still exist.

## 2023-05-16 ENCOUNTER — TELEPHONE (OUTPATIENT)
Dept: NEUROSURGERY | Facility: CLINIC | Age: 28
End: 2023-05-16
Payer: OTHER MISCELLANEOUS

## 2023-05-16 NOTE — TELEPHONE ENCOUNTER
Francisco the  of [employer] team called. He is stating patient has new symptoms. He is trying to get patient back into office. The [facility] called, the first available they see is July 6 for . Patient cannot wait that long. Sent message to  to see if there is anything earlier available.     Francisco:  [phone]

## 2023-05-23 ENCOUNTER — OFFICE VISIT (OUTPATIENT)
Dept: NEUROSURGERY | Facility: CLINIC | Age: 28
End: 2023-05-23
Payer: OTHER MISCELLANEOUS

## 2023-05-23 DIAGNOSIS — M51.16 NEURITIS OR RADICULITIS DUE TO RUPTURE OF LUMBAR INTERVERTEBRAL DISC: Primary | ICD-10-CM

## 2023-05-23 PROCEDURE — 99213 OFFICE O/P EST LOW 20 MIN: CPT | Performed by: NEUROLOGICAL SURGERY

## 2023-05-23 NOTE — PROGRESS NOTES
Subjective   Patient ID: Zach Ann is a 28 y.o. male is here today for follow-up with low back pain that radiates to the L thigh.    Today patient states that he has low back pain that radiates into the L thigh along with tingling in the L thigh    History of Present Illness    This patient has some recurrent symptoms.  He had some pain in his left hamstring a month or so ago which then developed into more nerve type pain running down into his left buttock and down his left leg.  He even has a little bit of symptom in the lateral calf on the left.  Otherwise he is doing okay.  This began after some stretching exercises.  He has been treated with some oral steroids which did not help very much.  He otherwise has not had any specific treatment other than the fact that he works with a  with his team already.    The following portions of the patient's history were reviewed and updated as appropriate: allergies, current medications, past family history, past medical history, past social history, past surgical history and problem list.    Review of Systems   Constitutional: Negative for chills and fever.   HENT: Negative for congestion.    Musculoskeletal: Positive for back pain and joint swelling.   Neurological: Positive for weakness and numbness.       I reviewed the review of systems listed by the patient and discussed by my MA    Objective     There were no vitals filed for this visit.  There is no height or weight on file to calculate BMI.    Tobacco Use: Low Risk    • Smoking Tobacco Use: Never   • Smokeless Tobacco Use: Never   • Passive Exposure: Not on file          Physical Exam  Eyes:      Extraocular Movements: EOM normal.      Pupils: Pupils are equal, round, and reactive to light.   Neurological:      Mental Status: He is alert and oriented to person, place, and time.      Coordination: Finger-Nose-Finger Test and Heel to Shin Test normal.      Gait: Gait is intact.      Deep Tendon  Reflexes:      Reflex Scores:       Tricep reflexes are 2+ on the right side and 2+ on the left side.       Bicep reflexes are 2+ on the right side and 2+ on the left side.       Brachioradialis reflexes are 2+ on the right side and 2+ on the left side.       Patellar reflexes are 2+ on the right side and 2+ on the left side.       Achilles reflexes are 2+ on the right side and 2+ on the left side.  Psychiatric:         Speech: Speech normal.       Neurologic Exam     Mental Status   Oriented to person, place, and time.   Registration of memory: Good recent and remote memory.   Attention: normal. Concentration: normal.   Speech: speech is normal   Level of consciousness: alert  Knowledge: consistent with education.     Cranial Nerves     CN II   Visual fields full to confrontation.   Visual acuity: normal    CN III, IV, VI   Pupils are equal, round, and reactive to light.  Extraocular motions are normal.     CN V   Facial sensation intact.   Right corneal reflex: normal  Left corneal reflex: normal    CN VII   Facial expression full, symmetric.   Right facial weakness: none  Left facial weakness: none    CN VIII   Hearing: intact    CN IX, X   Palate: symmetric    CN XI   Right sternocleidomastoid strength: normal  Left sternocleidomastoid strength: normal    CN XII   Tongue: not atrophic  Tongue deviation: none    Motor Exam   Muscle bulk: normal  Right arm tone: normal  Left arm tone: normal  Right leg tone: normal  Left leg tone: normal    Strength   Strength 5/5 except as noted.     Sensory Exam   Light touch normal.     Gait, Coordination, and Reflexes     Gait  Gait: normal    Coordination   Finger to nose coordination: normal  Heel to shin coordination: normal    Reflexes   Right brachioradialis: 2+  Left brachioradialis: 2+  Right biceps: 2+  Left biceps: 2+  Right triceps: 2+  Left triceps: 2+  Right patellar: 2+  Left patellar: 2+  Right achilles: 2+  Left achilles: 2+  Right : 2+  Left :  2+          Assessment & Plan   Independent Review of Radiographic Studies:      I personally reviewed the images from the following studies.    I reviewed his MRI from February.  This does show some scar tissue on the left side.  There was some concern about recurrent disc but I felt it was mostly scar tissue.    Medical Decision Making:      I told the patient and his  about the imaging and the exam.  I recommended that we check a new MRI.  We will see him back when that has been done.    Diagnoses and all orders for this visit:    1. Neuritis or radiculitis due to rupture of lumbar intervertebral disc (Primary)  -     MRI Lumbar Spine With & Without Contrast; Future      Return for After radiology test.

## 2023-05-25 ENCOUNTER — HOSPITAL ENCOUNTER (OUTPATIENT)
Dept: MRI IMAGING | Facility: HOSPITAL | Age: 28
Discharge: HOME | End: 2023-05-25

## 2023-05-25 DIAGNOSIS — M51.16 NEURITIS OR RADICULITIS DUE TO RUPTURE OF LUMBAR INTERVERTEBRAL DISC: ICD-10-CM

## 2023-05-25 PROCEDURE — 72158 MRI LUMBAR SPINE W/O & W/DYE: CPT

## 2023-05-25 PROCEDURE — 0 GADOBENATE DIMEGLUMINE 529 MG/ML SOLUTION: Performed by: NEUROLOGICAL SURGERY

## 2023-05-25 PROCEDURE — A9577 INJ MULTIHANCE: HCPCS | Performed by: NEUROLOGICAL SURGERY

## 2023-05-25 RX ADMIN — GADOBENATE DIMEGLUMINE 18 ML: 529 INJECTION, SOLUTION INTRAVENOUS at 10:26

## 2023-05-26 NOTE — PROGRESS NOTES
Subjective   Patient ID: Zach Ann is a 28 y.o. male is here today via telephone for follow-up with a new Lumbar MRI done on 05/25/2023 @n [facility].    You have chosen to receive care through a telephone visit. Do you consent to use a telephone visit for your medical care today? Yes    We had a telephone visit today.  The patient was at home and I was in the office.  We talked for 5 minutes.    History of Present Illness     This patient continues with intermittent symptoms in his leg.  Also time he feels okay but sometimes he gets electric shock sensations or even pain in his leg but then it goes away.  For the most part he is doing okay.    The following portions of the patient's history were reviewed and updated as appropriate: allergies, current medications, past family history, past medical history, past social history, past surgical history and problem list.    Review of Systems    I reviewed the review of systems listed by the patient and discussed by my MA    Objective       Tobacco Use: Low Risk    • Smoking Tobacco Use: Never   • Smokeless Tobacco Use: Never   • Passive Exposure: Not on file          Physical Exam  Neurological:      Mental Status: He is alert.       Neurologic Exam        Assessment & Plan   Independent Review of Radiographic Studies:      I personally reviewed the images from the following studies.    I reviewed his MRI done on 25 May.  This does show some recurrent disc herniation on the left side at L5-S1.  The one that was done in February showed a little recurrent disc herniation but mostly scar tissue.  There does appear to be some actual recurrent disc herniation at this point that does not enhance.  This appears to be somewhat larger than it was in February.    Medical Decision Making:      I told the patient and his  that from my point of view I would tend to just leave this alone at this point.  I think he should modify his training program to use lower weights and higher  reps and avoid axial loading if at all possible.  There is no way we can predict whether this disc will go away or get worse but I think if he gets worse his symptoms will get worse at which point then we will have to do something.  There is a small chance it could suddenly blow out but I do not think that the chances any higher based on these images then it would be if we went back in and get another surgery now.  The only thing that would really keep him from having a recurrent disc would be a fusion and I think doing a fusion in this man would be way too aggressive.  I told him to call me if anything flares up in the future.    Diagnoses and all orders for this visit:    1. Neuritis or radiculitis due to rupture of lumbar intervertebral disc (Primary)      Return if symptoms worsen or fail to improve.

## 2023-05-30 ENCOUNTER — OFFICE VISIT (OUTPATIENT)
Dept: NEUROSURGERY | Facility: CLINIC | Age: 28
End: 2023-05-30

## 2023-05-30 DIAGNOSIS — M51.16 NEURITIS OR RADICULITIS DUE TO RUPTURE OF LUMBAR INTERVERTEBRAL DISC: Primary | ICD-10-CM

## 2023-05-30 PROCEDURE — 99441 PR PHYS/QHP TELEPHONE EVALUATION 5-10 MIN: CPT | Performed by: NEUROLOGICAL SURGERY

## 2023-06-06 ENCOUNTER — TELEPHONE (OUTPATIENT)
Dept: SPORTS MEDICINE | Facility: CLINIC | Age: 28
End: 2023-06-06
Payer: COMMERCIAL

## 2023-06-06 NOTE — TELEPHONE ENCOUNTER
Today's note in follow-up to most recent training room visit on Wednesday, 5/31/2023.  I discussed treatment options in detail with Zach regarding his ongoing radicular back pain.  Specifically I discussed medication management with gabapentin to help alleviate his nerve pain while he can continue to train versus referral for epidural injection.  He declined both offers and wanted to reconsider these at future junction.  He has discussed further with our medical staff and is declining any medication management at this time.

## 2023-08-30 ENCOUNTER — DOCUMENTATION (OUTPATIENT)
Dept: SPORTS MEDICINE | Facility: CLINIC | Age: 28
End: 2023-08-30
Payer: COMMERCIAL

## 2023-08-30 DIAGNOSIS — M25.562 ACUTE PAIN OF LEFT KNEE: Primary | ICD-10-CM

## 2023-08-31 ENCOUNTER — HOSPITAL ENCOUNTER (OUTPATIENT)
Dept: MRI IMAGING | Facility: HOSPITAL | Age: 28
Discharge: HOME | End: 2023-08-31

## 2023-08-31 DIAGNOSIS — M25.562 ACUTE PAIN OF LEFT KNEE: ICD-10-CM

## 2023-08-31 PROCEDURE — 73721 MRI JNT OF LWR EXTRE W/O DYE: CPT

## 2023-09-06 ENCOUNTER — TELEPHONE (OUTPATIENT)
Dept: SPORTS MEDICINE | Facility: CLINIC | Age: 28
End: 2023-09-06
Payer: COMMERCIAL

## 2023-09-06 NOTE — TELEPHONE ENCOUNTER
I saw Zach today in the training room to review his MRI.  I showed him the images and discussed his injury.  He describes pain in the knee starting several weeks ago after doing rehab exercises for his back, associated with some swelling.  We recalled several weeks ago him mentioning some swelling in the knee but he declined evaluation at that time as it seemed to be improving at that time.  We reviewed his MRI and associated concerns.  I explained to the Baker's cyst is a reaction to the swelling in the joint and is presumably causing his posterior fullness and restricted flexion.  This is secondary to the articular pathology.  His ACL graft appears intact.  His trochlear cartilage has discrete injury with undercutting concerning for instability and future progression.  There is also associated subcortical marrow edema in this area.  I explained to him that my concern is that if this lesion is left untreated he could have rapidly progressive articular cartilage injury in this area of the knee.  We did discuss that the decision of how to treat it is rather debatable, whether nonsurgical with regenerative therapies or surgical with a number of options including autografting.  Explained my concern is that if the marrow edema settles, the cartilage fragment will remain unstable and if he increases his activity he could progress his injury to a more difficult to treat status.  I encouraged him to consider his options and offered to arrange multiple outside opinions including with Dr. Johnson at \[facility]\"" or Dr. Justin in [place].  He agreed to consider this carefully and will remain in touch with our medical team.

## 2023-12-20 NOTE — PROGRESS NOTES
"Subjective   Patient ID: Zach Ann is a 28 y.o. male is here today for follow-up with worsening symptoms.    Today patient states that he has nerve pain in his low back, along with N/T in the L foot intermittently    History of Present Illness    This patient continues with pain in his SI joint.  He has some occasional tingling in his left foot but nothing on a regular basis.  His problem is that whenever he tries to practice the pain flares up and he simply cannot practice and is still facing restrictions and what he can do.  He does feel that the rehab therapy is working.  He has been doing it now for 6 months and it has helped but it is still not back to where it needs to be in order for him to play.    The following portions of the patient's history were reviewed and updated as appropriate: allergies, current medications, past family history, past medical history, past social history, past surgical history, and problem list.    Review of Systems   Constitutional:  Negative for chills and fever.   HENT:  Negative for congestion.    Genitourinary:  Negative for difficulty urinating and dysuria.   Musculoskeletal:  Positive for back pain. Negative for gait problem.   Neurological:  Positive for numbness. Negative for weakness.       I reviewed the review of systems listed by the patient and discussed by my MA    Objective     Vitals:    12/21/23 0838   BP: 115/78   Cuff Size: Adult   Pulse: 65   Temp: 97.1 °F (36.2 °C)   SpO2: 98%   Weight: 88 kg (194 lb)   Height: 193 cm (75.98\")     Body mass index is 23.63 kg/m².    Tobacco Use: Low Risk  (12/21/2023)    Patient History     Smoking Tobacco Use: Never     Smokeless Tobacco Use: Never     Passive Exposure: Not on file          Physical Exam  Eyes:      Extraocular Movements: EOM normal.      Pupils: Pupils are equal, round, and reactive to light.   Neurological:      Mental Status: He is alert and oriented to person, place, and time.      Coordination: " Finger-Nose-Finger Test and Heel to Travis Test normal.      Gait: Gait is intact.      Deep Tendon Reflexes:      Reflex Scores:       Tricep reflexes are 2+ on the right side and 2+ on the left side.       Bicep reflexes are 2+ on the right side and 2+ on the left side.       Brachioradialis reflexes are 2+ on the right side and 2+ on the left side.       Patellar reflexes are 2+ on the right side and 2+ on the left side.       Achilles reflexes are 2+ on the right side and 2+ on the left side.  Psychiatric:         Speech: Speech normal.       Neurologic Exam     Mental Status   Oriented to person, place, and time.   Registration of memory: Good recent and remote memory.   Attention: normal. Concentration: normal.   Speech: speech is normal   Level of consciousness: alert  Knowledge: consistent with education.     Cranial Nerves     CN II   Visual fields full to confrontation.   Visual acuity: normal    CN III, IV, VI   Pupils are equal, round, and reactive to light.  Extraocular motions are normal.     CN V   Facial sensation intact.   Right corneal reflex: normal  Left corneal reflex: normal    CN VII   Facial expression full, symmetric.   Right facial weakness: none  Left facial weakness: none    CN VIII   Hearing: intact    CN IX, X   Palate: symmetric    CN XI   Right sternocleidomastoid strength: normal  Left sternocleidomastoid strength: normal    CN XII   Tongue: not atrophic  Tongue deviation: none    Motor Exam   Muscle bulk: normal  Right arm tone: normal  Left arm tone: normal  Right leg tone: normal  Left leg tone: normal    Strength   Strength 5/5 except as noted.     Sensory Exam   Light touch normal.     Gait, Coordination, and Reflexes     Gait  Gait: normal    Coordination   Finger to nose coordination: normal  Heel to shin coordination: normal    Reflexes   Right brachioradialis: 2+  Left brachioradialis: 2+  Right biceps: 2+  Left biceps: 2+  Right triceps: 2+  Left triceps: 2+  Right patellar:  2+  Left patellar: 2+  Right achilles: 2+  Left achilles: 2+  Right : 2+  Left : 2+          Assessment & Plan   Independent Review of Radiographic Studies:      I personally reviewed the images from the following studies.    I reviewed his MRI from May.  This did raise the question of a recurrent disc herniation on the left at L4-5.    Medical Decision Making:      I told the patient I do not think that the MRI in May was accurate.  If he in fact had a recurrent disc herniation I think he would have a lot more trouble with his leg which she does not have.  On the other hand the SI joint is problematic.  I offered him an SI joint injection but he really prefers to stay away from that.  I have no objection to that and I explained I was simply trying to enhance his recovery.  Otherwise he will stay on his rehab therapy that he has been doing.  There is no way he can play right now and he will need to stay off work.  Will call me if anything gets worse and we will investigate further.    Diagnoses and all orders for this visit:    1. Sacroiliitis (Primary)      Return if symptoms worsen or fail to improve.

## 2023-12-21 ENCOUNTER — OFFICE VISIT (OUTPATIENT)
Dept: NEUROSURGERY | Facility: CLINIC | Age: 28
End: 2023-12-21
Payer: OTHER MISCELLANEOUS

## 2023-12-21 VITALS
HEART RATE: 65 BPM | OXYGEN SATURATION: 98 % | HEIGHT: 76 IN | WEIGHT: 194 LBS | BODY MASS INDEX: 23.62 KG/M2 | TEMPERATURE: 97.1 F | SYSTOLIC BLOOD PRESSURE: 115 MMHG | DIASTOLIC BLOOD PRESSURE: 78 MMHG

## 2023-12-21 DIAGNOSIS — M46.1 SACROILIITIS: Primary | ICD-10-CM

## 2023-12-21 PROCEDURE — 99213 OFFICE O/P EST LOW 20 MIN: CPT | Performed by: NEUROLOGICAL SURGERY

## 2024-01-25 ENCOUNTER — TELEPHONE (OUTPATIENT)
Dept: NEUROSURGERY | Facility: OTHER | Age: 29
End: 2024-01-25
Payer: OTHER MISCELLANEOUS

## 2024-01-25 NOTE — TELEPHONE ENCOUNTER
PATIENT CALLED IN TO SCHEDULE A FOLLOW UP FOR W/C PURPOSES- HE STATED HE LIVES IN [place] AND IS INQUIRING IF HE CAN DO A TELEPHONE VISIT- W/C DID ADVISE HIM THEY WILL COVER TELEPHONE VISIT FOR EVALUATION- HE ALSO STATED THAT THIS IS NOT A PRESSING ISSUE SO FIRST WEEK OF MARCH WOULD BE OKAY IF DR COLIN IS AVAILABLE.    - [phone]    PLEASE ADVISE- THANK YOU.

## 2024-01-25 NOTE — TELEPHONE ENCOUNTER
S/W PATIENT AND INFORMED OF APPT WITH DR. COLIN ON 02/22/2024 @ 3:30, PATIENT EXPRESSED UNDERSTANDING

## 2024-02-21 ENCOUNTER — TELEPHONE (OUTPATIENT)
Dept: NEUROSURGERY | Facility: CLINIC | Age: 29
End: 2024-02-21
Payer: OTHER MISCELLANEOUS

## 2024-02-21 NOTE — TELEPHONE ENCOUNTER
Johnny for patient informing that we need to do his televisit earlier tomorrow because Dr. Stanton has a meeting at the time of his televisit

## 2024-02-22 ENCOUNTER — CLINICAL SUPPORT (OUTPATIENT)
Dept: NEUROSURGERY | Facility: CLINIC | Age: 29
End: 2024-02-22
Payer: OTHER MISCELLANEOUS

## 2024-02-22 DIAGNOSIS — M51.16 NEURITIS OR RADICULITIS DUE TO RUPTURE OF LUMBAR INTERVERTEBRAL DISC: Primary | ICD-10-CM

## 2024-02-22 PROCEDURE — 99441 PR PHYS/QHP TELEPHONE EVALUATION 5-10 MIN: CPT | Performed by: NEUROLOGICAL SURGERY

## 2024-02-22 NOTE — PROGRESS NOTES
Subjective   Patient ID: Zach Ann is a 28 y.o. male is here today via telephone for follow-up.    You have chosen to receive care through a telephone visit. Do you consent to use a telephone visit for your medical care today? Yes     We had a telephone visit today.  The patient was at home and I was in the office.  We talked for 5 minutes.    History of Present Illness    This patient was last seen at the end of December.  At the time he was having some pain in his sacroiliac joint and some tingling in his left foot but nothing on a regular basis.  He still has pain whenever he does any activities.  Since then he says the numbness in his foot has pretty much gone away.  He does still have some on and off back pain and had a flareup a week or so ago which is still bothering him a little bit with pain in his back but he does seem to be improving still overall.    The following portions of the patient's history were reviewed and updated as appropriate: allergies, current medications, past family history, past medical history, past social history, past surgical history, and problem list.    Review of Systems    I reviewed the review of systems listed by the patient and discussed by my MA    Objective       Tobacco Use: Low Risk  (2/22/2024)    Patient History     Smoking Tobacco Use: Never     Smokeless Tobacco Use: Never     Passive Exposure: Not on file          Physical Exam  Neurological:      Mental Status: He is alert and oriented to person, place, and time.       Neurologic Exam     Mental Status   Oriented to person, place, and time.           Assessment & Plan   Independent Review of Radiographic Studies:      I personally reviewed the images from the following studies.    I again reviewed his MRI that was done in May of last year.  This raises the question of a recurrent disc herniation on the left side at L5-S1.    Medical Decision Making:      I told the patient I thought he should continue with the  therapy.  I explained that the MRI does show the recurrent disc herniation but it is a little difficult to make a case to go back in there with his symptoms being so minimal.  On the other hand I think it is going to take a while for him to completely rehab and we will check on him again in a month.    Diagnoses and all orders for this visit:    1. Neuritis or radiculitis due to rupture of lumbar intervertebral disc (Primary)      Return in about 4 weeks (around 3/21/2024).

## 2024-02-26 ENCOUNTER — TELEPHONE (OUTPATIENT)
Dept: NEUROSURGERY | Facility: CLINIC | Age: 29
End: 2024-02-26
Payer: OTHER MISCELLANEOUS

## 2024-03-22 NOTE — PROGRESS NOTES
Subjective   Patient ID: Zach Ann is a 28 y.o. male is here today via telephone for 1 month follow-up .    You have chosen to receive care through a telephone visit. Do you consent to use a telephone visit for your medical care today? Yes     We had a telephone visit today.  The patient was at home and I was in the office.  We talked for 5 minutes.    History of Present Illness    The patient had a little bit of a setback in February.  Since then he has been working on rehab and the problem seems to be improving.  He had some increased pain in his back and down his leg.  He still has a little more numbness in his foot than he did when I last talked to him but it is coming and going and seems to be improving.    The following portions of the patient's history were reviewed and updated as appropriate: allergies, current medications, past family history, past medical history, past social history, past surgical history, and problem list.    Review of Systems    I reviewed the review of systems listed by the patient and discussed by my MA    Objective     Tobacco Use: Low Risk  (3/26/2024)    Patient History     Smoking Tobacco Use: Never     Smokeless Tobacco Use: Never     Passive Exposure: Not on file          Physical Exam  Neurological:      Mental Status: He is alert and oriented to person, place, and time.       Neurologic Exam     Mental Status   Oriented to person, place, and time.           Assessment & Plan   Independent Review of Radiographic Studies:      I personally reviewed the images from the following studies.    No new imaging to review    Medical Decision Making:      I told the patient that from my point of view he should stick with the rehab at this point.  I suggest that he stay off work for at least another month.  His insurance carrier prefers monthly conferences.  We will talk to him again in a month.    Diagnoses and all orders for this visit:    1. Neuritis or radiculitis due to rupture of  lumbar intervertebral disc (Primary)      Return in about 4 weeks (around 4/23/2024).

## 2024-03-26 ENCOUNTER — CLINICAL SUPPORT (OUTPATIENT)
Dept: NEUROSURGERY | Facility: CLINIC | Age: 29
End: 2024-03-26
Payer: OTHER MISCELLANEOUS

## 2024-03-26 DIAGNOSIS — M51.16 NEURITIS OR RADICULITIS DUE TO RUPTURE OF LUMBAR INTERVERTEBRAL DISC: Primary | ICD-10-CM

## 2024-03-26 PROCEDURE — 99441 PR PHYS/QHP TELEPHONE EVALUATION 5-10 MIN: CPT | Performed by: NEUROLOGICAL SURGERY

## 2024-04-22 NOTE — PROGRESS NOTES
Subjective   Patient ID: Zach Ann is a 29 y.o. male is here via telephone today for 4 week follow-up.    You have chosen to receive care through a telephone visit. Do you consent to use a telephone visit for your medical care today? Yes     We had a telephone visit today.  The patient was at home and I was in the office.  We talked for 5 minutes.    History of Present Illness    I talked to this patient today on the phone.  He is making progress.  He had a setback in February with a lot of pain in his back and down his leg but that seems to be improving at this point.  He really is not having as much trouble as he was and is working to rehab the muscles both in his back and in his legs.    The following portions of the patient's history were reviewed and updated as appropriate: allergies, current medications, past family history, past medical history, past social history, past surgical history, and problem list.    Review of Systems    I reviewed the review of systems listed by the patient and discussed by my MA    Objective     There were no vitals filed for this visit.  There is no height or weight on file to calculate BMI.    Tobacco Use: Low Risk  (4/25/2024)    Patient History     Smoking Tobacco Use: Never     Smokeless Tobacco Use: Never     Passive Exposure: Not on file          Physical Exam  Neurological:      Mental Status: He is alert and oriented to person, place, and time.       Neurologic Exam     Mental Status   Oriented to person, place, and time.           Assessment & Plan   Independent Review of Radiographic Studies:      I personally reviewed the images from the following studies.    There is no new imaging to review    Medical Decision Making:      I told the patient to stick with his rehab.  I will see him back in about a month.  I do not think there is any way he can go back to playing at this point.    Diagnoses and all orders for this visit:    1. Neuritis or radiculitis due to rupture  of lumbar intervertebral disc (Primary)      Return in about 4 weeks (around 5/23/2024).

## 2024-04-25 ENCOUNTER — CLINICAL SUPPORT (OUTPATIENT)
Dept: NEUROSURGERY | Facility: CLINIC | Age: 29
End: 2024-04-25
Payer: OTHER MISCELLANEOUS

## 2024-04-25 DIAGNOSIS — M51.16 NEURITIS OR RADICULITIS DUE TO RUPTURE OF LUMBAR INTERVERTEBRAL DISC: Primary | ICD-10-CM

## 2024-04-25 PROCEDURE — 99441 PR PHYS/QHP TELEPHONE EVALUATION 5-10 MIN: CPT | Performed by: NEUROLOGICAL SURGERY

## 2024-05-24 NOTE — PROGRESS NOTES
Subjective   Patient ID: Zach Ann is a 29 y.o. male is here today via telephone for 4 week follow-up.    You have chosen to receive care through a telephone visit. Do you consent to use a telephone visit for your medical care today? Yes     History of Present Illness    I talked this patient today on the phone.  He is continuing to progress.  He still has some weakness but he feels that his strength is improved over the last month.    The following portions of the patient's history were reviewed and updated as appropriate: allergies, current medications, past family history, past medical history, past social history, past surgical history, and problem list.    Review of Systems    I reviewed the review of systems listed by the patient and discussed by my MA    Objective     Tobacco Use: Low Risk  (4/25/2024)    Patient History     Smoking Tobacco Use: Never     Smokeless Tobacco Use: Never     Passive Exposure: Not on file          Physical Exam  Neurological:      Mental Status: He is alert and oriented to person, place, and time.       Neurologic Exam     Mental Status   Oriented to person, place, and time.           Assessment & Plan   Independent Review of Radiographic Studies:      I personally reviewed the images from the following studies.    There is no new imaging to review    Medical Decision Making:      I told the patient that from my point of view he should continue on his rehab program.  We will talk again in about a month.  As long as he is continuing to improve I do not know that we need to do anything else.    Diagnoses and all orders for this visit:    1. Neuritis or radiculitis due to rupture of lumbar intervertebral disc (Primary)      Return in about 4 weeks (around 7/9/2024).

## 2024-06-11 ENCOUNTER — CLINICAL SUPPORT (OUTPATIENT)
Dept: NEUROSURGERY | Facility: CLINIC | Age: 29
End: 2024-06-11
Payer: OTHER MISCELLANEOUS

## 2024-06-11 DIAGNOSIS — M51.16 NEURITIS OR RADICULITIS DUE TO RUPTURE OF LUMBAR INTERVERTEBRAL DISC: Primary | ICD-10-CM

## 2024-07-11 NOTE — PROGRESS NOTES
Subjective   Patient ID: Zach Ann is a 29 y.o. male is here today via telephone for 4 week follow-up.    You have chosen to receive care through a telephone visit. Do you consent to use a telephone visit for your medical care today? Yes     We had a telephone visit today.  The patient was at home and I was in the office.  We talked for 5 minutes.    History of Present Illness    I talked this patient today.  He is continuing to do better.  He just finished his 16-week trial of aggressive physical therapy and is feeling better.    The following portions of the patient's history were reviewed and updated as appropriate: allergies, current medications, past family history, past medical history, past social history, past surgical history, and problem list.    Review of Systems    I reviewed the review of systems listed by the patient and discussed by my MA    Objective       Tobacco Use: Low Risk  (7/16/2024)    Patient History     Smoking Tobacco Use: Never     Smokeless Tobacco Use: Never     Passive Exposure: Not on file          Physical Exam  Neurological:      Mental Status: He is alert and oriented to person, place, and time.       Neurologic Exam     Mental Status   Oriented to person, place, and time.           Assessment & Plan   Independent Review of Radiographic Studies:      I personally reviewed the images from the following studies.    There is no new imaging to review    Medical Decision Making:      I told the patient that from my point of view he can go ahead and start the next phase of his training which involves another 16-week program.  We will talk again in a couple of months.    Diagnoses and all orders for this visit:    1. Neuritis or radiculitis due to rupture of lumbar intervertebral disc (Primary)      Return in about 2 months (around 9/16/2024).

## 2024-07-16 ENCOUNTER — CLINICAL SUPPORT (OUTPATIENT)
Dept: NEUROSURGERY | Facility: CLINIC | Age: 29
End: 2024-07-16
Payer: MEDICAID

## 2024-07-16 DIAGNOSIS — M51.16 NEURITIS OR RADICULITIS DUE TO RUPTURE OF LUMBAR INTERVERTEBRAL DISC: Primary | ICD-10-CM

## 2024-09-17 ENCOUNTER — CLINICAL SUPPORT (OUTPATIENT)
Dept: NEUROSURGERY | Facility: CLINIC | Age: 29
End: 2024-09-17
Payer: MEDICAID

## 2024-09-17 DIAGNOSIS — M51.16 NEURITIS OR RADICULITIS DUE TO RUPTURE OF LUMBAR INTERVERTEBRAL DISC: Primary | ICD-10-CM

## 2024-09-17 PROCEDURE — 99441 PR PHYS/QHP TELEPHONE EVALUATION 5-10 MIN: CPT | Performed by: NEUROLOGICAL SURGERY

## 2024-11-18 NOTE — PROGRESS NOTES
"Subjective   Patient ID: Zach Ann is a 29 y.o. male is here today for 2 month follow-up.    History of Present Illness    This patient returns today.  He continues to improve.  He feels that he is getting stronger each week.  He is still not back to 100% however.  He did see someone in [place] for a independent medical evaluation who agreed with this plan of continuing to work with him.  He has pain when he runs but that continues to improve.    The following portions of the patient's history were reviewed and updated as appropriate: allergies, current medications, past family history, past medical history, past social history, past surgical history, and problem list.      Objective     Vitals:    11/19/24 0924   BP: 110/70   Cuff Size: Adult   Pulse: 57   Temp: 96.9 °F (36.1 °C)   SpO2: 97%   Weight: 93 kg (205 lb)   Height: 193 cm (76\")     Body mass index is 24.95 kg/m².    Tobacco Use: Low Risk  (11/19/2024)    Patient History     Smoking Tobacco Use: Never     Smokeless Tobacco Use: Never     Passive Exposure: Not on file          Physical Exam    Neurological:      Mental Status: He is alert and oriented to person, place, and time.       Neurological Exam    Mental Status  Alert. Oriented to person, place, and time.            Assessment & Plan   Independent Review of Radiographic Studies:      I personally reviewed the images from the following studies.    There is no new imaging to review    Medical Decision Making:      I told the patient I had debated on doing another MRI but frankly if he is getting better we would not do any surgery on him anyway.  Therefore I think he should just continue with therapy at this point.  We will check him again in a couple of months but I do not think he can go back to work at this point.    Diagnoses and all orders for this visit:    1. Neuritis or radiculitis due to rupture of lumbar intervertebral disc (Primary)      Return in about 2 months (around " 1/19/2025).

## 2024-11-19 ENCOUNTER — OFFICE VISIT (OUTPATIENT)
Dept: NEUROSURGERY | Facility: CLINIC | Age: 29
End: 2024-11-19
Payer: OTHER MISCELLANEOUS

## 2024-11-19 VITALS
WEIGHT: 205 LBS | SYSTOLIC BLOOD PRESSURE: 110 MMHG | HEART RATE: 57 BPM | DIASTOLIC BLOOD PRESSURE: 70 MMHG | OXYGEN SATURATION: 97 % | HEIGHT: 76 IN | TEMPERATURE: 96.9 F | BODY MASS INDEX: 24.96 KG/M2

## 2024-11-19 DIAGNOSIS — M51.16 NEURITIS OR RADICULITIS DUE TO RUPTURE OF LUMBAR INTERVERTEBRAL DISC: Primary | ICD-10-CM

## 2024-11-19 PROCEDURE — 99213 OFFICE O/P EST LOW 20 MIN: CPT | Performed by: NEUROLOGICAL SURGERY

## 2025-01-02 ENCOUNTER — TELEPHONE (OUTPATIENT)
Dept: NEUROSURGERY | Facility: CLINIC | Age: 30
End: 2025-01-02
Payer: OTHER MISCELLANEOUS

## 2025-01-02 NOTE — TELEPHONE ENCOUNTER
CHUCKY for patient informing that we are moving his televisit from 1/21/25 to 1/17/25 @ 10:30 due to Dr. Stanton being out of the office    HUB OK TO GIVE MESSAGE

## 2025-01-10 NOTE — PROGRESS NOTES
Subjective   Patient ID: Zach Ann is a 29 y.o. male is here today via telephone for 2 month follow-up.    You have chosen to receive care through a telephone visit. Do you consent to use a telephone visit for your medical care today? Yes     We had a telephone visit today.  The patient was at home and I was in the office.  We talked for 5 minutes.    History of Present Illness    I talked to this patient today on the phone.  He is doing better than when I talked to him last.  He still has quite a bit of pain in his back if he does a really aggressive workout for a long practice he has to rest for several days.  On the other hand he was unable to do any of that at all year ago.  I asked him if he thought he was making improvements enough that if they continued he would get back to being able to play and he is planning to try and do that.  The only question is going to be when.    The following portions of the patient's history were reviewed and updated as appropriate: allergies, current medications, past family history, past medical history, past social history, past surgical history, and problem list.      Objective     Tobacco Use: Low Risk  (1/17/2025)    Patient History     Smoking Tobacco Use: Never     Smokeless Tobacco Use: Never     Passive Exposure: Not on file          Physical Exam    Neurological:      Mental Status: He is alert and oriented to person, place, and time.       Neurological Exam    Mental Status  Alert. Oriented to person, place, and time.            Assessment & Plan   Independent Review of Radiographic Studies:      I personally reviewed the images from the following studies.    There is no new imaging to review    Medical Decision Making:      I told the patient to stay on his exercise program.  We will talk again in about 2 months.  I think that with the history of having to rest for several days after an aggressive workout it is still not appropriate for him to go back to  work.    Diagnoses and all orders for this visit:    1. Neuritis or radiculitis due to rupture of lumbar intervertebral disc (Primary)      Return in about 2 months (around 3/17/2025).

## 2025-01-17 ENCOUNTER — CLINICAL SUPPORT (OUTPATIENT)
Dept: NEUROSURGERY | Facility: CLINIC | Age: 30
End: 2025-01-17
Payer: OTHER MISCELLANEOUS

## 2025-01-17 DIAGNOSIS — M51.16 NEURITIS OR RADICULITIS DUE TO RUPTURE OF LUMBAR INTERVERTEBRAL DISC: Primary | ICD-10-CM

## 2025-03-17 NOTE — PROGRESS NOTES
Subjective   Patient ID: Zach Ann is a 29 y.o. male is here today via video for 2 month follow-up.    .You have chosen to receive care through a telehealth visit.  Do you consent to use a video/audio connection for your medical care today? Yes     We had a video visit today.  The patient was at home and I was in the office.  We talked for 5 minutes.    History of Present Illness    This patient continues to make very slow improvement.  He still has pain in his back when he gets really active  in a point where he could return to playing. but still nothe does feel like he is better than he was 3 months ago.    The following portions of the patient's history were reviewed and updated as appropriate: allergies, current medications, past family history, past medical history, past social history, past surgical history, and problem list.      Objective       Tobacco Use: Low Risk  (1/17/2025)    Patient History     Smoking Tobacco Use: Never     Smokeless Tobacco Use: Never     Passive Exposure: Not on file          Physical Exam    Neurological:      Mental Status: He is alert and oriented to person, place, and time.       Neurological Exam    Mental Status  Alert. Oriented to person, place, and time.            Assessment & Plan   Independent Review of Radiographic Studies:      I personally reviewed the images from the following studies.    There is no new imaging to review    Medical Decision Making:      I told the patient that he should stay on his therapy.  Will order some more therapy.  I will plan to see him again in about 3 months.  We can do a video visit at that time.    Diagnoses and all orders for this visit:    1. Neuritis or radiculitis due to rupture of lumbar intervertebral disc (Primary)  -     Ambulatory Referral to Physical Therapy for Evaluation & Treatment      Return in about 3 months (around 6/18/2025).

## 2025-03-18 ENCOUNTER — TELEMEDICINE (OUTPATIENT)
Dept: NEUROSURGERY | Facility: CLINIC | Age: 30
End: 2025-03-18
Payer: MEDICAID

## 2025-03-18 DIAGNOSIS — M51.16 NEURITIS OR RADICULITIS DUE TO RUPTURE OF LUMBAR INTERVERTEBRAL DISC: Primary | ICD-10-CM

## 2025-03-18 PROCEDURE — 99213 OFFICE O/P EST LOW 20 MIN: CPT | Performed by: NEUROLOGICAL SURGERY

## 2025-03-27 ENCOUNTER — TELEPHONE (OUTPATIENT)
Dept: NEUROSURGERY | Facility: CLINIC | Age: 30
End: 2025-03-27
Payer: MEDICAID

## 2025-03-27 NOTE — TELEPHONE ENCOUNTER
PT CALLED: HE IS NEEDING THE PT ORDER/REFERRAL FAXED TO HIS W/C ADJUSTOR -[phone]. HE IS NEEDING THE REFERRAL TO BE UPDATED TO [facility] FOR ALBA MONTANEZ. PLEASE ADVISE! THANKS!    LAST VISIT: Telemedicine with Lane Stanton MD (03/18/2025)       PT CONTACT: [phone]  BEST TIME TO CALL: ANYTIME

## 2025-03-28 NOTE — TELEPHONE ENCOUNTER
Left message for patient regarding sending the PT referral to his W/C  but there's no way to send the referral to [facility]  Reese Diogenes. There's no phone number or fax number. Asked the patient to call the office with that information. Referral was sent to his .

## 2025-03-28 NOTE — TELEPHONE ENCOUNTER
Caller: Zach Ann    Relationship to patient: Self    Best call back number: [phone]    Patient is needing: PATIENT CALLED BACK AND STATES THAT BEST PHONE NUMBER FOR ALBA [id number]-[phone]-PATIENT ADVISED HE DID NOT HAVE A FAX-SENDING TO OFFICE TOA DVISE OF CALL THANK YOU

## 2025-04-10 ENCOUNTER — TELEPHONE (OUTPATIENT)
Dept: NEUROSURGERY | Facility: CLINIC | Age: 30
End: 2025-04-10

## 2025-04-10 NOTE — TELEPHONE ENCOUNTER
Caller: PACY     Relationship: W/C      Best call back number: [phone]    What form or medical record are you requesting: WORK STATUS PAPERWORK    Who is requesting this form or medical record from you: W/C    How would you like to receive the form or medical records (pick-up, mail, fax): FAX  If fax, what is the fax number: [phone]    Timeframe paperwork needed: ASAP    Additional notes: PACY FROM W/C CALLED AND STATED THEY NEED PAPERWORK CONFIRMING THE PT'S CONTINUANCE OF BEING OFF WORK OR RETURN TO WORK - STATES IT WAS NOT IN MEDICAL NOTE

## 2025-06-09 NOTE — PROGRESS NOTES
Subjective   Patient ID: Zach Ann is a 30 y.o. male is here today video for 3 month follow-up.    .You have chosen to receive care through a telehealth visit.  Do you consent to use a video/audio connection for your medical care today? Yes     We had a video visit today.  The patient was at home and I was in the office.  We talked for 5 minutes.    History of Present Illness    I talked to this patient today.  He continues to make progress but he still has some weakness in his foot and his toes particularly.  This makes it impossible for him to be able to run because of the nerve damage.  Unfortunately this may well be permanent at this point.  He really feels like he is functioning well enough that he would not want to have any other surgery but it still does not allow him to function at the level he was prior to having this problem.    The following portions of the patient's history were reviewed and updated as appropriate: allergies, current medications, past family history, past medical history, past social history, past surgical history, and problem list.      Objective     There were no vitals filed for this visit.  There is no height or weight on file to calculate BMI.    Tobacco Use: Low Risk  (1/17/2025)    Patient History     Smoking Tobacco Use: Never     Smokeless Tobacco Use: Never     Passive Exposure: Not on file          Physical Exam    Neurological:      Mental Status: He is alert and oriented to person, place, and time.       Neurological Exam    Mental Status  Alert. Oriented to person, place, and time.            Assessment & Plan   Independent Review of Radiographic Studies:      I personally reviewed the images from the following studies.    I reviewed an MRI from 2 years ago.  This does show a recurrent disc on the left at L5-S1.    Medical Decision Making:      I told the patient I thought we should check a new MRI of his lumbar spine at this point.  If the disc is reabsorbed then surgery  would not even be an option.  He is living in [place] now and so we will get the MRI done there and do a video visit again to go over the results.    Diagnoses and all orders for this visit:    1. Neuritis or radiculitis due to rupture of lumbar intervertebral disc (Primary)  -     MRI Lumbar Spine With & Without Contrast; Future      Return for After radiology test.

## 2025-06-10 ENCOUNTER — TELEMEDICINE (OUTPATIENT)
Dept: NEUROSURGERY | Facility: CLINIC | Age: 30
End: 2025-06-10
Payer: MEDICAID

## 2025-06-10 DIAGNOSIS — M51.16 NEURITIS OR RADICULITIS DUE TO RUPTURE OF LUMBAR INTERVERTEBRAL DISC: Primary | ICD-10-CM

## 2025-06-10 PROCEDURE — 99213 OFFICE O/P EST LOW 20 MIN: CPT | Performed by: NEUROLOGICAL SURGERY

## 2025-06-20 ENCOUNTER — TELEPHONE (OUTPATIENT)
Dept: NEUROSURGERY | Facility: CLINIC | Age: 30
End: 2025-06-20
Payer: OTHER MISCELLANEOUS

## 2025-06-20 NOTE — TELEPHONE ENCOUNTER
Caller: TACY    Relationship: [employer]    Best call back number: [phone]    What form or medical record are you requesting: WORK STATUS LETTER FROM 06/10/25    Who is requesting this form or medical record from you: [employer]    How would you like to receive the form or medical records (pick-up, mail, fax): FAX    If fax, what is the fax number: [phone]  Timeframe paperwork needed: ASAP    Additional notes: THE PATIENT'S LAST APPT WAS 06/10/25

## 2025-07-24 ENCOUNTER — TELEPHONE (OUTPATIENT)
Dept: NEUROSURGERY | Facility: CLINIC | Age: 30
End: 2025-07-24
Payer: OTHER MISCELLANEOUS

## 2025-07-24 NOTE — TELEPHONE ENCOUNTER
PT HASN'T BEEN CONTACTED BY RADIOLOGY TO SCHEDULE MRI. REFERRAL DOESN'T PROVIDE ANY ADDITIONAL INFORMATION. SHOWS IN [employer] THERE IS AN AUTHORIZATION FROM [facility]. PLEASE ADVISE.

## 2025-07-29 NOTE — TELEPHONE ENCOUNTER
Call transferred form the H[facility].      Brian from [employer] called inquiring the status of - MRI LUMBAR SPINE W WO CONTRAST.  She reports patient is currently in [place].  She would like to know if we are going to schedule MRI for patient out of state or if she should be doing that.  Please call her back with an update.      Phone number [phone]  Fax # [phone] if order needs to be faxed

## 2025-07-29 NOTE — TELEPHONE ENCOUNTER
Called and spoke with Brian regarding MRI order. Will fax order to Brian and she will call facility to schedule MRI for patient.

## 2025-08-15 ENCOUNTER — TELEPHONE (OUTPATIENT)
Dept: NEUROSURGERY | Facility: CLINIC | Age: 30
End: 2025-08-15
Payer: OTHER MISCELLANEOUS

## 2025-08-15 DIAGNOSIS — M51.16 NEURITIS OR RADICULITIS DUE TO RUPTURE OF LUMBAR INTERVERTEBRAL DISC: Primary | ICD-10-CM

## 2025-08-28 ENCOUNTER — TELEPHONE (OUTPATIENT)
Dept: NEUROSURGERY | Facility: CLINIC | Age: 30
End: 2025-08-28
Payer: OTHER MISCELLANEOUS

## (undated) DEVICE — CONN TBG Y 5 IN 1 LF STRL

## (undated) DEVICE — GLV SURG BIOGEL LTX PF 7

## (undated) DEVICE — Device

## (undated) DEVICE — TOOL MR8-15BA50T MR8 15CM BAL SYMTRI 5MM: Brand: MIDAS REX MR8

## (undated) DEVICE — DRP MICROSCOPE 4 BINOCULAR CV 54X150IN

## (undated) DEVICE — APPL CHLORAPREP HI/LITE 26ML ORNG

## (undated) DEVICE — SYR CONTRL PRESS/LO FIX/M/LL W/THMB/RNG 10ML

## (undated) DEVICE — SOL NACL 0.9PCT 100ML SGL

## (undated) DEVICE — NEEDLE, QUINCKE, 20GX3.5": Brand: MEDLINE

## (undated) DEVICE — UNDYED BRAIDED (POLYGLACTIN 910), SYNTHETIC ABSORBABLE SUTURE: Brand: COATED VICRYL

## (undated) DEVICE — SMOKE EVACUATION TUBING WITH 7/8 IN TO 1/4 IN REDUCER: Brand: BUFFALO FILTER

## (undated) DEVICE — GLV SURG SENSICARE W/ALOE PF LF 7.5 STRL

## (undated) DEVICE — SPONGE,NEURO,.75"X.75",XR,STRL,LF,10/PK: Brand: MEDLINE

## (undated) DEVICE — PK NEURO SPINE 40

## (undated) DEVICE — 3M™ STERI-STRIP™ ANTIMICROBIAL SKIN CLOSURES 1/2 INCH X 4 INCHES 50/CARTON 4 CARTONS/CASE A1847: Brand: 3M™ STERI-STRIP™

## (undated) DEVICE — ANTIBACTERIAL UNDYED BRAIDED (POLYGLACTIN 910), SYNTHETIC ABSORBABLE SUTURE: Brand: COATED VICRYL

## (undated) DEVICE — PATIENT RETURN ELECTRODE, SINGLE-USE, CONTACT QUALITY MONITORING, ADULT, WITH 9FT CORD, FOR PATIENTS WEIGING OVER 33LBS. (15KG): Brand: MEGADYNE

## (undated) DEVICE — PENCL ES MEGADINE EZ/CLEAN BUTN W/HOLSTR 10FT

## (undated) DEVICE — INTENDED FOR TISSUE SEPARATION, AND OTHER PROCEDURES THAT REQUIRE A SHARP SURGICAL BLADE TO PUNCTURE OR CUT.: Brand: BARD-PARKER ® STAINLESS STEEL BLADES

## (undated) DEVICE — SPNG GZ WOVN 4X4IN 12PLY 10/BX STRL

## (undated) DEVICE — ADHS LIQ MASTISOL 2/3ML